# Patient Record
Sex: MALE | Race: WHITE | NOT HISPANIC OR LATINO | Employment: OTHER | URBAN - METROPOLITAN AREA
[De-identification: names, ages, dates, MRNs, and addresses within clinical notes are randomized per-mention and may not be internally consistent; named-entity substitution may affect disease eponyms.]

---

## 2024-09-18 ENCOUNTER — APPOINTMENT (OUTPATIENT)
Dept: RADIOLOGY | Facility: MEDICAL CENTER | Age: 74
DRG: 607 | End: 2024-09-18
Attending: EMERGENCY MEDICINE
Payer: MEDICARE

## 2024-09-18 ENCOUNTER — HOSPITAL ENCOUNTER (INPATIENT)
Facility: MEDICAL CENTER | Age: 74
LOS: 4 days | DRG: 607 | End: 2024-09-22
Attending: EMERGENCY MEDICINE
Payer: MEDICARE

## 2024-09-18 ENCOUNTER — APPOINTMENT (RX ONLY)
Dept: URBAN - METROPOLITAN AREA CLINIC 4 | Facility: CLINIC | Age: 74
Setting detail: DERMATOLOGY
End: 2024-09-18

## 2024-09-18 DIAGNOSIS — D485 NEOPLASM OF UNCERTAIN BEHAVIOR OF SKIN: ICD-10-CM

## 2024-09-18 DIAGNOSIS — R22.0 FACIAL MASS: ICD-10-CM

## 2024-09-18 DIAGNOSIS — Z91.89 AT RISK FOR CONSTIPATION: ICD-10-CM

## 2024-09-18 DIAGNOSIS — D64.9 NORMOCYTIC ANEMIA: ICD-10-CM

## 2024-09-18 DIAGNOSIS — E83.52 HYPERCALCEMIA: ICD-10-CM

## 2024-09-18 DIAGNOSIS — L03.211 FACIAL CELLULITIS: ICD-10-CM

## 2024-09-18 PROBLEM — D48.5 NEOPLASM OF UNCERTAIN BEHAVIOR OF SKIN: Status: ACTIVE | Noted: 2024-09-18

## 2024-09-18 LAB
ALBUMIN SERPL BCP-MCNC: 3.5 G/DL (ref 3.2–4.9)
ALBUMIN/GLOB SERPL: 1.1 G/DL
ALP SERPL-CCNC: 110 U/L (ref 30–99)
ALT SERPL-CCNC: 13 U/L (ref 2–50)
ANION GAP SERPL CALC-SCNC: 10 MMOL/L (ref 7–16)
APTT PPP: 26.5 SEC (ref 24.7–36)
AST SERPL-CCNC: 17 U/L (ref 12–45)
BASOPHILS # BLD AUTO: 0.3 % (ref 0–1.8)
BASOPHILS # BLD: 0.1 K/UL (ref 0–0.12)
BILIRUB SERPL-MCNC: 0.3 MG/DL (ref 0.1–1.5)
BUN SERPL-MCNC: 14 MG/DL (ref 8–22)
CALCIUM ALBUM COR SERPL-MCNC: 11.6 MG/DL (ref 8.5–10.5)
CALCIUM SERPL-MCNC: 11.2 MG/DL (ref 8.5–10.5)
CHLORIDE SERPL-SCNC: 103 MMOL/L (ref 96–112)
CO2 SERPL-SCNC: 24 MMOL/L (ref 20–33)
CREAT SERPL-MCNC: 0.81 MG/DL (ref 0.5–1.4)
EOSINOPHIL # BLD AUTO: 0.35 K/UL (ref 0–0.51)
EOSINOPHIL NFR BLD: 1.1 % (ref 0–6.9)
ERYTHROCYTE [DISTWIDTH] IN BLOOD BY AUTOMATED COUNT: 45.7 FL (ref 35.9–50)
GFR SERPLBLD CREATININE-BSD FMLA CKD-EPI: 91 ML/MIN/1.73 M 2
GLOBULIN SER CALC-MCNC: 3.2 G/DL (ref 1.9–3.5)
GLUCOSE SERPL-MCNC: 94 MG/DL (ref 65–99)
HCT VFR BLD AUTO: 27.4 % (ref 42–52)
HGB BLD-MCNC: 8.5 G/DL (ref 14–18)
IMM GRANULOCYTES # BLD AUTO: 0.21 K/UL (ref 0–0.11)
IMM GRANULOCYTES NFR BLD AUTO: 0.7 % (ref 0–0.9)
INR PPP: 1.08 (ref 0.87–1.13)
LYMPHOCYTES # BLD AUTO: 3.38 K/UL (ref 1–4.8)
LYMPHOCYTES NFR BLD: 10.9 % (ref 22–41)
MCH RBC QN AUTO: 28.2 PG (ref 27–33)
MCHC RBC AUTO-ENTMCNC: 31 G/DL (ref 32.3–36.5)
MCV RBC AUTO: 91 FL (ref 81.4–97.8)
MONOCYTES # BLD AUTO: 1.38 K/UL (ref 0–0.85)
MONOCYTES NFR BLD AUTO: 4.5 % (ref 0–13.4)
NEUTROPHILS # BLD AUTO: 25.53 K/UL (ref 1.82–7.42)
NEUTROPHILS NFR BLD: 82.5 % (ref 44–72)
NRBC # BLD AUTO: 0 K/UL
NRBC BLD-RTO: 0 /100 WBC (ref 0–0.2)
PLATELET # BLD AUTO: 561 K/UL (ref 164–446)
PMV BLD AUTO: 8.3 FL (ref 9–12.9)
POTASSIUM SERPL-SCNC: 4.1 MMOL/L (ref 3.6–5.5)
PROT SERPL-MCNC: 6.7 G/DL (ref 6–8.2)
PROTHROMBIN TIME: 14.1 SEC (ref 12–14.6)
RBC # BLD AUTO: 3.01 M/UL (ref 4.7–6.1)
SODIUM SERPL-SCNC: 137 MMOL/L (ref 135–145)
WBC # BLD AUTO: 31 K/UL (ref 4.8–10.8)

## 2024-09-18 PROCEDURE — A9270 NON-COVERED ITEM OR SERVICE: HCPCS

## 2024-09-18 PROCEDURE — 99223 1ST HOSP IP/OBS HIGH 75: CPT | Mod: GC

## 2024-09-18 PROCEDURE — 70450 CT HEAD/BRAIN W/O DYE: CPT

## 2024-09-18 PROCEDURE — 85025 COMPLETE CBC W/AUTO DIFF WBC: CPT

## 2024-09-18 PROCEDURE — ? ADDITIONAL NOTES

## 2024-09-18 PROCEDURE — 80053 COMPREHEN METABOLIC PANEL: CPT

## 2024-09-18 PROCEDURE — 36415 COLL VENOUS BLD VENIPUNCTURE: CPT

## 2024-09-18 PROCEDURE — 99024 POSTOP FOLLOW-UP VISIT: CPT

## 2024-09-18 PROCEDURE — 700102 HCHG RX REV CODE 250 W/ 637 OVERRIDE(OP)

## 2024-09-18 PROCEDURE — 70491 CT SOFT TISSUE NECK W/DYE: CPT

## 2024-09-18 PROCEDURE — 85610 PROTHROMBIN TIME: CPT

## 2024-09-18 PROCEDURE — 700117 HCHG RX CONTRAST REV CODE 255: Performed by: EMERGENCY MEDICINE

## 2024-09-18 PROCEDURE — 85730 THROMBOPLASTIN TIME PARTIAL: CPT

## 2024-09-18 PROCEDURE — 770006 HCHG ROOM/CARE - MED/SURG/GYN SEMI*

## 2024-09-18 PROCEDURE — ? PHOTO-DOCUMENTATION

## 2024-09-18 PROCEDURE — 99285 EMERGENCY DEPT VISIT HI MDM: CPT

## 2024-09-18 RX ORDER — AMOXICILLIN 250 MG
2 CAPSULE ORAL EVERY EVENING
Status: DISCONTINUED | OUTPATIENT
Start: 2024-09-18 | End: 2024-09-22 | Stop reason: HOSPADM

## 2024-09-18 RX ORDER — IBUPROFEN 200 MG
200 TABLET ORAL EVERY 6 HOURS PRN
COMMUNITY

## 2024-09-18 RX ORDER — POLYETHYLENE GLYCOL 3350 17 G/17G
1 POWDER, FOR SOLUTION ORAL
Status: DISCONTINUED | OUTPATIENT
Start: 2024-09-18 | End: 2024-09-22 | Stop reason: HOSPADM

## 2024-09-18 RX ORDER — ENOXAPARIN SODIUM 100 MG/ML
40 INJECTION SUBCUTANEOUS DAILY
Status: DISCONTINUED | OUTPATIENT
Start: 2024-09-19 | End: 2024-09-22 | Stop reason: HOSPADM

## 2024-09-18 RX ORDER — CLINDAMYCIN PHOSPHATE 600 MG/50ML
600 INJECTION, SOLUTION INTRAVENOUS ONCE
Status: DISCONTINUED | OUTPATIENT
Start: 2024-09-18 | End: 2024-09-18

## 2024-09-18 RX ORDER — SODIUM CHLORIDE 9 MG/ML
INJECTION, SOLUTION INTRAVENOUS CONTINUOUS
Status: ACTIVE | OUTPATIENT
Start: 2024-09-18 | End: 2024-09-20

## 2024-09-18 RX ORDER — LABETALOL HYDROCHLORIDE 5 MG/ML
10 INJECTION, SOLUTION INTRAVENOUS EVERY 4 HOURS PRN
Status: DISCONTINUED | OUTPATIENT
Start: 2024-09-18 | End: 2024-09-22 | Stop reason: HOSPADM

## 2024-09-18 RX ORDER — ACETAMINOPHEN 325 MG/1
650 TABLET ORAL EVERY 6 HOURS PRN
Status: DISCONTINUED | OUTPATIENT
Start: 2024-09-18 | End: 2024-09-19

## 2024-09-18 RX ADMIN — IOHEXOL 90 ML: 350 INJECTION, SOLUTION INTRAVENOUS at 19:30

## 2024-09-18 RX ADMIN — SENNOSIDES AND DOCUSATE SODIUM 2 TABLET: 50; 8.6 TABLET ORAL at 21:24

## 2024-09-18 RX ADMIN — ACETAMINOPHEN 650 MG: 325 TABLET ORAL at 21:24

## 2024-09-18 SDOH — ECONOMIC STABILITY: TRANSPORTATION INSECURITY
IN THE PAST 12 MONTHS, HAS THE LACK OF TRANSPORTATION KEPT YOU FROM MEDICAL APPOINTMENTS OR FROM GETTING MEDICATIONS?: NO

## 2024-09-18 SDOH — ECONOMIC STABILITY: TRANSPORTATION INSECURITY
IN THE PAST 12 MONTHS, HAS LACK OF RELIABLE TRANSPORTATION KEPT YOU FROM MEDICAL APPOINTMENTS, MEETINGS, WORK OR FROM GETTING THINGS NEEDED FOR DAILY LIVING?: NO

## 2024-09-18 ASSESSMENT — COGNITIVE AND FUNCTIONAL STATUS - GENERAL
SUGGESTED CMS G CODE MODIFIER DAILY ACTIVITY: CH
DAILY ACTIVITIY SCORE: 24
MOBILITY SCORE: 24
SUGGESTED CMS G CODE MODIFIER MOBILITY: CH

## 2024-09-18 ASSESSMENT — ENCOUNTER SYMPTOMS
ABDOMINAL PAIN: 0
MYALGIAS: 0
CONSTIPATION: 0
DIARRHEA: 0
VOMITING: 0
WEIGHT LOSS: 0
BACK PAIN: 0
HEARTBURN: 0
CHILLS: 0
FEVER: 0
HEADACHES: 0
DIZZINESS: 0
PALPITATIONS: 0
NAUSEA: 0
WEAKNESS: 0

## 2024-09-18 ASSESSMENT — LIFESTYLE VARIABLES
TOTAL SCORE: 0
AVERAGE NUMBER OF DAYS PER WEEK YOU HAVE A DRINK CONTAINING ALCOHOL: 0
HAVE YOU EVER FELT YOU SHOULD CUT DOWN ON YOUR DRINKING: NO
TOTAL SCORE: 0
ALCOHOL_USE: NO
EVER HAD A DRINK FIRST THING IN THE MORNING TO STEADY YOUR NERVES TO GET RID OF A HANGOVER: NO
EVER FELT BAD OR GUILTY ABOUT YOUR DRINKING: NO
HOW MANY TIMES IN THE PAST YEAR HAVE YOU HAD 5 OR MORE DRINKS IN A DAY: 0
HAVE PEOPLE ANNOYED YOU BY CRITICIZING YOUR DRINKING: NO
CONSUMPTION TOTAL: NEGATIVE
DOES PATIENT WANT TO STOP DRINKING: NO
ON A TYPICAL DAY WHEN YOU DRINK ALCOHOL HOW MANY DRINKS DO YOU HAVE: 0
TOTAL SCORE: 0

## 2024-09-18 ASSESSMENT — SOCIAL DETERMINANTS OF HEALTH (SDOH)
WITHIN THE PAST 12 MONTHS, THE FOOD YOU BOUGHT JUST DIDN'T LAST AND YOU DIDN'T HAVE MONEY TO GET MORE: NEVER TRUE
IN THE PAST 12 MONTHS, HAS THE ELECTRIC, GAS, OIL, OR WATER COMPANY THREATENED TO SHUT OFF SERVICE IN YOUR HOME?: NO
WITHIN THE LAST YEAR, HAVE TO BEEN RAPED OR FORCED TO HAVE ANY KIND OF SEXUAL ACTIVITY BY YOUR PARTNER OR EX-PARTNER?: NO
WITHIN THE LAST YEAR, HAVE YOU BEEN AFRAID OF YOUR PARTNER OR EX-PARTNER?: NO
WITHIN THE LAST YEAR, HAVE YOU BEEN KICKED, HIT, SLAPPED, OR OTHERWISE PHYSICALLY HURT BY YOUR PARTNER OR EX-PARTNER?: NO
WITHIN THE LAST YEAR, HAVE YOU BEEN HUMILIATED OR EMOTIONALLY ABUSED IN OTHER WAYS BY YOUR PARTNER OR EX-PARTNER?: NO
WITHIN THE PAST 12 MONTHS, YOU WORRIED THAT YOUR FOOD WOULD RUN OUT BEFORE YOU GOT THE MONEY TO BUY MORE: NEVER TRUE

## 2024-09-18 ASSESSMENT — PAIN DESCRIPTION - PAIN TYPE
TYPE: ACUTE PAIN
TYPE: ACUTE PAIN

## 2024-09-18 ASSESSMENT — PATIENT HEALTH QUESTIONNAIRE - PHQ9
1. LITTLE INTEREST OR PLEASURE IN DOING THINGS: NOT AT ALL
SUM OF ALL RESPONSES TO PHQ9 QUESTIONS 1 AND 2: 0
2. FEELING DOWN, DEPRESSED, IRRITABLE, OR HOPELESS: NOT AT ALL

## 2024-09-18 ASSESSMENT — FIBROSIS 4 INDEX: FIB4 SCORE: 0.64

## 2024-09-18 NOTE — ED TRIAGE NOTES
Chief Complaint   Patient presents with    Sent by MD     Pt ambulated to triage sent by Skin Specialist to get evaluation for skin lesion in right side of face. Pt states that he had it for a year.      Also reports pain in right side of face rates as 8/10. Afebrile. Aaox4. Images taken and uploaded in pt's chart.  Educated on triage process. Instructed to notify staff for any worsening symptoms. Denies any recent travel. Denies exposure to known covid positive patients. Denies any respiratory symptoms.    Vitals:    09/18/24 1424   BP: 139/64   Pulse: 96   Resp: 18   Temp: 36.5 °C (97.7 °F)   SpO2: 98%

## 2024-09-18 NOTE — ED NOTES
Patient to green 38 from triage. Agree with triage note. Patient reports the wound has been unchanged for a year.

## 2024-09-18 NOTE — PROCEDURE: ADDITIONAL NOTES
Additional Notes: Patient presents today with clear diagnosis of cancer, although probable SCC, we are unable to determine without biopsy. We discussed options of doing multiple biopsies today of suspicious lesions on patients face and head, however, patient only has Medicare Part A insurance. Given this is not a tumor than can be managed outpatient, Dr. Hernandez and Dr. Read were also consulted and evaluated the patient, mutually agreeing that he would be best treated at [facility] for his condition. We discussed that there is a secondary infection as well, that is leading to the drainage and malodor, which must be treated with antibiotics to prevent sepsis. After discussing in great detail and explaining the severity of the situation to the patient, he agreed to go to [facility] to be admitted. Dr. Hernandez contacted [facility] prior to patient’s arrival.
Detail Level: Simple
Render Risk Assessment In Note?: no

## 2024-09-18 NOTE — PROCEDURE: PHOTO-DOCUMENTATION
Detail Level: Zone
Photo Preface (Leave Blank If You Do Not Want): Photographs were obtained today on face

## 2024-09-19 LAB
ALBUMIN SERPL BCP-MCNC: 2.9 G/DL (ref 3.2–4.9)
ALBUMIN SERPL BCP-MCNC: 3.2 G/DL (ref 3.2–4.9)
ALBUMIN/GLOB SERPL: 1.3 G/DL
ALP SERPL-CCNC: 96 U/L (ref 30–99)
ALT SERPL-CCNC: 12 U/L (ref 2–50)
ANION GAP SERPL CALC-SCNC: 13 MMOL/L (ref 7–16)
AST SERPL-CCNC: 15 U/L (ref 12–45)
BILIRUB SERPL-MCNC: 0.2 MG/DL (ref 0.1–1.5)
BUN SERPL-MCNC: 12 MG/DL (ref 8–22)
BUN SERPL-MCNC: 12 MG/DL (ref 8–22)
CALCIUM ALBUM COR SERPL-MCNC: 11.5 MG/DL (ref 8.5–10.5)
CALCIUM ALBUM COR SERPL-MCNC: 11.7 MG/DL (ref 8.5–10.5)
CALCIUM SERPL-MCNC: 10.8 MG/DL (ref 8.5–10.5)
CALCIUM SERPL-MCNC: 10.9 MG/DL (ref 8.5–10.5)
CHLORIDE SERPL-SCNC: 104 MMOL/L (ref 96–112)
CHLORIDE SERPL-SCNC: 108 MMOL/L (ref 96–112)
CO2 SERPL-SCNC: 22 MMOL/L (ref 20–33)
CO2 SERPL-SCNC: 23 MMOL/L (ref 20–33)
CREAT SERPL-MCNC: 0.72 MG/DL (ref 0.5–1.4)
CREAT SERPL-MCNC: 1.01 MG/DL (ref 0.5–1.4)
ERYTHROCYTE [DISTWIDTH] IN BLOOD BY AUTOMATED COUNT: 45.1 FL (ref 35.9–50)
FERRITIN SERPL-MCNC: 27.7 NG/ML (ref 22–322)
GFR SERPLBLD CREATININE-BSD FMLA CKD-EPI: 77 ML/MIN/1.73 M 2
GFR SERPLBLD CREATININE-BSD FMLA CKD-EPI: 94 ML/MIN/1.73 M 2
GLOBULIN SER CALC-MCNC: 2.5 G/DL (ref 1.9–3.5)
GLUCOSE SERPL-MCNC: 135 MG/DL (ref 65–99)
GLUCOSE SERPL-MCNC: 79 MG/DL (ref 65–99)
GRAM STN SPEC: NORMAL
HCT VFR BLD AUTO: 27.4 % (ref 42–52)
HGB BLD-MCNC: 8.5 G/DL (ref 14–18)
IRON SATN MFR SERPL: 7 % (ref 15–55)
IRON SERPL-MCNC: 17 UG/DL (ref 50–180)
MCH RBC QN AUTO: 28 PG (ref 27–33)
MCHC RBC AUTO-ENTMCNC: 31 G/DL (ref 32.3–36.5)
MCV RBC AUTO: 90.1 FL (ref 81.4–97.8)
PHOSPHATE SERPL-MCNC: 2.1 MG/DL (ref 2.5–4.5)
PLATELET # BLD AUTO: 528 K/UL (ref 164–446)
PMV BLD AUTO: 8.5 FL (ref 9–12.9)
POTASSIUM SERPL-SCNC: 3.9 MMOL/L (ref 3.6–5.5)
POTASSIUM SERPL-SCNC: 3.9 MMOL/L (ref 3.6–5.5)
PROT SERPL-MCNC: 5.7 G/DL (ref 6–8.2)
PTH-INTACT SERPL-MCNC: 2.7 PG/ML (ref 14–72)
RBC # BLD AUTO: 3.04 M/UL (ref 4.7–6.1)
SCCMEC + MECA PNL NOSE NAA+PROBE: NEGATIVE
SIGNIFICANT IND 70042: NORMAL
SITE SITE: NORMAL
SODIUM SERPL-SCNC: 139 MMOL/L (ref 135–145)
SODIUM SERPL-SCNC: 140 MMOL/L (ref 135–145)
SOURCE SOURCE: NORMAL
TIBC SERPL-MCNC: 261 UG/DL (ref 250–450)
UIBC SERPL-MCNC: 244 UG/DL (ref 110–370)
WBC # BLD AUTO: 28.2 K/UL (ref 4.8–10.8)

## 2024-09-19 PROCEDURE — 85027 COMPLETE CBC AUTOMATED: CPT

## 2024-09-19 PROCEDURE — 700111 HCHG RX REV CODE 636 W/ 250 OVERRIDE (IP): Mod: JZ | Performed by: INTERNAL MEDICINE

## 2024-09-19 PROCEDURE — 99418 PROLNG IP/OBS E/M EA 15 MIN: CPT | Performed by: INTERNAL MEDICINE

## 2024-09-19 PROCEDURE — 36415 COLL VENOUS BLD VENIPUNCTURE: CPT

## 2024-09-19 PROCEDURE — 87070 CULTURE OTHR SPECIMN AEROBIC: CPT

## 2024-09-19 PROCEDURE — 83970 ASSAY OF PARATHORMONE: CPT

## 2024-09-19 PROCEDURE — 87077 CULTURE AEROBIC IDENTIFY: CPT | Mod: 91

## 2024-09-19 PROCEDURE — 770006 HCHG ROOM/CARE - MED/SURG/GYN SEMI*

## 2024-09-19 PROCEDURE — 87186 SC STD MICRODIL/AGAR DIL: CPT

## 2024-09-19 PROCEDURE — 80053 COMPREHEN METABOLIC PANEL: CPT

## 2024-09-19 PROCEDURE — 700111 HCHG RX REV CODE 636 W/ 250 OVERRIDE (IP): Mod: JZ

## 2024-09-19 PROCEDURE — 99233 SBSQ HOSP IP/OBS HIGH 50: CPT | Performed by: INTERNAL MEDICINE

## 2024-09-19 PROCEDURE — A9270 NON-COVERED ITEM OR SERVICE: HCPCS | Performed by: INTERNAL MEDICINE

## 2024-09-19 PROCEDURE — 82728 ASSAY OF FERRITIN: CPT

## 2024-09-19 PROCEDURE — 700105 HCHG RX REV CODE 258

## 2024-09-19 PROCEDURE — A9270 NON-COVERED ITEM OR SERVICE: HCPCS

## 2024-09-19 PROCEDURE — 87040 BLOOD CULTURE FOR BACTERIA: CPT | Mod: 91

## 2024-09-19 PROCEDURE — 87205 SMEAR GRAM STAIN: CPT

## 2024-09-19 PROCEDURE — 83550 IRON BINDING TEST: CPT

## 2024-09-19 PROCEDURE — 700101 HCHG RX REV CODE 250: Performed by: INTERNAL MEDICINE

## 2024-09-19 PROCEDURE — 87076 CULTURE ANAEROBE IDENT EACH: CPT

## 2024-09-19 PROCEDURE — 80069 RENAL FUNCTION PANEL: CPT

## 2024-09-19 PROCEDURE — 87641 MR-STAPH DNA AMP PROBE: CPT

## 2024-09-19 PROCEDURE — 700102 HCHG RX REV CODE 250 W/ 637 OVERRIDE(OP): Performed by: INTERNAL MEDICINE

## 2024-09-19 PROCEDURE — 97602 WOUND(S) CARE NON-SELECTIVE: CPT

## 2024-09-19 PROCEDURE — 83540 ASSAY OF IRON: CPT

## 2024-09-19 PROCEDURE — 700102 HCHG RX REV CODE 250 W/ 637 OVERRIDE(OP)

## 2024-09-19 RX ORDER — BACITRACIN ZINC AND POLYMYXIN B SULFATE 500; 1000 [USP'U]/G; [USP'U]/G
OINTMENT TOPICAL 2 TIMES DAILY
Status: DISCONTINUED | OUTPATIENT
Start: 2024-09-19 | End: 2024-09-22 | Stop reason: HOSPADM

## 2024-09-19 RX ORDER — ACETAMINOPHEN 325 MG/1
650 TABLET ORAL EVERY 6 HOURS PRN
Status: DISCONTINUED | OUTPATIENT
Start: 2024-09-24 | End: 2024-09-22 | Stop reason: HOSPADM

## 2024-09-19 RX ORDER — OXYCODONE HYDROCHLORIDE 5 MG/1
5 TABLET ORAL
Status: DISCONTINUED | OUTPATIENT
Start: 2024-09-19 | End: 2024-09-22 | Stop reason: HOSPADM

## 2024-09-19 RX ORDER — FERROUS SULFATE 325(65) MG
325 TABLET ORAL 2 TIMES DAILY WITH MEALS
Status: DISCONTINUED | OUTPATIENT
Start: 2024-09-19 | End: 2024-09-22 | Stop reason: HOSPADM

## 2024-09-19 RX ORDER — ACETAMINOPHEN 325 MG/1
650 TABLET ORAL EVERY 6 HOURS
Status: DISCONTINUED | OUTPATIENT
Start: 2024-09-19 | End: 2024-09-22 | Stop reason: HOSPADM

## 2024-09-19 RX ORDER — HYDROMORPHONE HYDROCHLORIDE 1 MG/ML
0.25 INJECTION, SOLUTION INTRAMUSCULAR; INTRAVENOUS; SUBCUTANEOUS
Status: DISCONTINUED | OUTPATIENT
Start: 2024-09-19 | End: 2024-09-22 | Stop reason: HOSPADM

## 2024-09-19 RX ORDER — LINEZOLID 600 MG/1
600 TABLET, FILM COATED ORAL EVERY 12 HOURS
Status: DISCONTINUED | OUTPATIENT
Start: 2024-09-19 | End: 2024-09-19

## 2024-09-19 RX ORDER — OXYCODONE HYDROCHLORIDE 5 MG/1
2.5 TABLET ORAL
Status: DISCONTINUED | OUTPATIENT
Start: 2024-09-19 | End: 2024-09-22 | Stop reason: HOSPADM

## 2024-09-19 RX ADMIN — Medication 1 EACH: at 17:07

## 2024-09-19 RX ADMIN — ACETAMINOPHEN 650 MG: 325 TABLET ORAL at 22:20

## 2024-09-19 RX ADMIN — OXYCODONE HYDROCHLORIDE 5 MG: 5 TABLET ORAL at 17:39

## 2024-09-19 RX ADMIN — OXYCODONE HYDROCHLORIDE 5 MG: 5 TABLET ORAL at 22:21

## 2024-09-19 RX ADMIN — SODIUM CHLORIDE: 9 INJECTION, SOLUTION INTRAVENOUS at 22:22

## 2024-09-19 RX ADMIN — ACETAMINOPHEN 650 MG: 325 TABLET ORAL at 05:02

## 2024-09-19 RX ADMIN — AMPICILLIN AND SULBACTAM 3 G: 1; 2 INJECTION, POWDER, FOR SOLUTION INTRAMUSCULAR; INTRAVENOUS at 12:18

## 2024-09-19 RX ADMIN — HYDROMORPHONE HYDROCHLORIDE 0.25 MG: 1 INJECTION, SOLUTION INTRAMUSCULAR; INTRAVENOUS; SUBCUTANEOUS at 19:41

## 2024-09-19 RX ADMIN — AMPICILLIN AND SULBACTAM 3 G: 1; 2 INJECTION, POWDER, FOR SOLUTION INTRAMUSCULAR; INTRAVENOUS at 00:56

## 2024-09-19 RX ADMIN — SODIUM CHLORIDE: 9 INJECTION, SOLUTION INTRAVENOUS at 00:54

## 2024-09-19 RX ADMIN — FERROUS SULFATE TAB 325 MG (65 MG ELEMENTAL FE) 325 MG: 325 (65 FE) TAB at 17:07

## 2024-09-19 RX ADMIN — ACETAMINOPHEN 650 MG: 325 TABLET ORAL at 12:23

## 2024-09-19 RX ADMIN — AMOXICILLIN AND CLAVULANATE POTASSIUM 1 TABLET: 875; 125 TABLET, FILM COATED ORAL at 17:07

## 2024-09-19 RX ADMIN — Medication 1 EACH: at 13:04

## 2024-09-19 RX ADMIN — AMPICILLIN AND SULBACTAM 3 G: 1; 2 INJECTION, POWDER, FOR SOLUTION INTRAMUSCULAR; INTRAVENOUS at 05:05

## 2024-09-19 RX ADMIN — ACETAMINOPHEN 650 MG: 325 TABLET ORAL at 17:07

## 2024-09-19 RX ADMIN — LINEZOLID 600 MG: 600 TABLET, FILM COATED ORAL at 05:02

## 2024-09-19 RX ADMIN — SENNOSIDES AND DOCUSATE SODIUM 2 TABLET: 50; 8.6 TABLET ORAL at 17:07

## 2024-09-19 ASSESSMENT — ENCOUNTER SYMPTOMS
CHILLS: 0
EYE REDNESS: 0
PALPITATIONS: 0
NERVOUS/ANXIOUS: 0
INSOMNIA: 0
DIZZINESS: 0
SHORTNESS OF BREATH: 0
COUGH: 0
WHEEZING: 0
DIARRHEA: 0
FEVER: 0
VOMITING: 0
FALLS: 0
WEAKNESS: 0
NAUSEA: 0
FOCAL WEAKNESS: 0
HEADACHES: 0
SEIZURES: 0
TREMORS: 0
CONSTIPATION: 0
LOSS OF CONSCIOUSNESS: 0
ABDOMINAL PAIN: 0
MYALGIAS: 0
HEMOPTYSIS: 0
EYE PAIN: 0
BLOOD IN STOOL: 0

## 2024-09-19 ASSESSMENT — PAIN DESCRIPTION - PAIN TYPE
TYPE: ACUTE PAIN

## 2024-09-19 NOTE — ASSESSMENT & PLAN NOTE
Suspect hypercalcemia of malignancy   -PTH with morning labs to further eval for other causes   -IVF        Mild  IV fluids  Trend

## 2024-09-19 NOTE — CARE PLAN
The patient is Stable - Low risk of patient condition declining or worsening    Shift Goals  Clinical Goals: Pt will report a pain level of 4 or less within 12 hour shift  Patient Goals: pain control  Family Goals: gaetano    Progress made toward(s) clinical / shift goals:  Pt alert and able to make needs known. Call light and personal belon    Problem: Pain - Standard  Goal: Alleviation of pain or a reduction in pain to the patient’s comfort goal  Outcome: Progressing     Problem: Knowledge Deficit - Standard  Goal: Patient and family/care givers will demonstrate understanding of plan of care, disease process/condition, diagnostic tests and medications  Outcome: Progressing       Patient is not progressing towards the following goals:

## 2024-09-19 NOTE — ANTIMICROBIAL STEWARDSHIP
Antimicrobial Stewardship Rounds Note    Date  9/19/2024    Assessment  Patient chart reviewed during antimicrobial stewardship rounds with antimicrobial stewardship medical director Dr. Jericho Alatorre. Patient admit for evaluation of fungating facial mass on right side of face including ear. Patient reports mass for last 7 years that rapidly proliferate in the last year. Patient has not had this evaluated before and reports not seeing a physician before but he sees a homeopath. Patient describes the mass as a shocking or burning pain that varies in intensity and has interfered with sleep. The head CT without contrast showed the mass invading the right external auditory canal and subcutaneous tissues that is consistent with squamous cell carcinoma. There is another mass right anterior superior frontal subcutaneous tissue also consistent with squamous cell carcinoma. Patient does have high leukocytosis with WBC 31 down to 28.2 but has been afebrile. Patient's MRSA nares is negative and blood cultures are no growth to date.    Recommendation  Based on the assessment above, the antimicrobial stewardship team recommends stopping linezolid and Unasyn and completing 10 days of treatment with Augmentin 875-125 mg by mouth twice daily. Discussed with Dr. Grossman, who agreed. Orders were updated in the chart by this pharmacist.     Luis Angel Lozano, PharmD  [facility] Clinical Specialist

## 2024-09-19 NOTE — WOUND TEAM
[facility]  Inpatient Services  Initial Wound and Skin Care Evaluation    Admission Date: 2024     Last order of IP CONSULT TO WOUND CARE was found on 2024 from Hospital Encounter on 2024     HPI, PMH, SH: Reviewed    No past surgical history on file.  Social History     Tobacco Use    Smoking status: Former     Current packs/day: 0.00     Average packs/day: 0.5 packs/day for 6.0 years (3.0 ttl pk-yrs)     Types: Cigarettes     Start date: 1968     Quit date: 1974     Years since quittin.7    Smokeless tobacco: Never   Substance Use Topics    Alcohol use: Not on file     Chief Complaint   Patient presents with    Sent by MD     Pt ambulated to triage sent by Skin Specialist to get evaluation for skin lesion in right side of face. Pt states that he had it for a year.      Diagnosis: Facial mass [R22.0]    Unit where seen by Wound Team: S609/01     WOUND CONSULT RELATED TO:  Right Face    WOUND TEAM PLAN OF CARE - Frequency of Follow-up:   Nursing to follow dressing orders written for wound care. Contact wound team if area fails to progress, deteriorates or with any questions/concerns if something comes up before next scheduled follow up (See below as to whether wound is following and frequency of wound follow up)   Weekly - Right face    WOUND HISTORY:   Patient is a 74-year-old male with unknown past medical history presents due to right-sided facial mass after being sent over by skin specialist. Patient states the mass has been growing for the past 7 years, drainage and sloughing of skin, with CT scan demonstrating extension to the external auditory canal, slight erythema detailing the margins with leukocytosis likely suggesting acute infection secondary to necrotic mass possibly resembling squamous cell carcinoma. Started patient on empiric linezolid and Unasyn, will likely need skin biopsy as well as plastic surgery for resection, see media for pictures. Also has  hypercalcemia, will give NS and trend, possibly secondary to malignancy.        WOUND ASSESSMENT/LDA  Wound 09/18/24 Neoplasm Ear;Face Lateral Right (Active)   Date First Assessed/Time First Assessed: 09/18/24 2124   Present on Original Admission: Yes  Hand Hygiene Completed: Yes  Primary Wound Type: Neoplasm  Location: Ear;Face  Wound Orientation: Lateral  Laterality: Right      Assessments 9/19/2024 11:30 AM   Wound Image      Site Assessment Yellow;Brown;Crusted;Slough;Drainage;Bleeding;Painful   Periwound Assessment Intact;Pink;Fragile   Margins Attached edges;Defined edges   Closure Secondary intention   Drainage Amount Moderate   Drainage Description Tan;Serosanguineous;Purulent   Treatments Cleansed;Nonselective debridement;Wound culture   Wound Cleansing Foam Cleanser/Washcloth   Periwound Protectant Not Applicable   Dressing Status Clean;Dry;Intact   Dressing Changed New   Dressing Cleansing/Solutions Not Applicable   Dressing Options Absorbent Abdominal Pad;Spandage   Dressing Change/Treatment Frequency Daily, and As Needed   NEXT Dressing Change/Treatment Date 09/20/24   Wound Team Following Weekly   Wound Length (cm) 12.5 cm   Wound Width (cm) 12 cm   Wound Surface Area (cm^2) 150 cm^2   Shape irregular   Wound Odor Strong   WOUND NURSE ONLY - Time Spent with Patient (mins) 45       Wound 09/18/24 Neoplasm Forehead Left (Active)   Date First Assessed/Time First Assessed: 09/18/24 2124   Present on Original Admission: Yes  Hand Hygiene Completed: Yes  Primary Wound Type: Neoplasm  Location: Forehead  Laterality: Left      Assessments 9/19/2024 11:30 AM   Wound Image     Site Assessment Brown;Yellow;Crusted   Periwound Assessment Clean;Dry;Intact   Margins Defined edges   Closure Open to air   Drainage Amount None   Wound Cleansing Not Applicable   Periwound Protectant Not Applicable   Dressing Status Open to Air   Wound Team Following Not following       L heel  R heel  Sacrum    Vascular:    SWEETIE:   No  results found.    Lab Values:    Lab Results   Component Value Date/Time    WBC 28.2 (H) 09/19/2024 12:12 AM    RBC 3.04 (L) 09/19/2024 12:12 AM    HEMOGLOBIN 8.5 (L) 09/19/2024 12:12 AM    HEMATOCRIT 27.4 (L) 09/19/2024 12:12 AM         Culture Results show:  No results found for this or any previous visit (from the past 720 hour(s)).    Pain Level/Medicated:  None, Tolerated without pain medication       INTERVENTIONS BY WOUND TEAM:  Chart and images reviewed. Discussed with bedside RN. All areas of concern (based on picture review, LDA review and discussion with bedside RN) have been thoroughly assessed. Documentation of areas based on significant findings. This RN in to assess patient. Performed standard wound care which includes appropriate positioning, dressing removal and non-selective debridement. Pictures and measurements obtained weekly if/when required.    Wound:  Right face  Cleansed/Non-selectively Debrided with:  Normal Saline, No rinse foam soap, and Gauze  Yenny wound: Cleansed with Normal Saline, No rinse foam soap, and Gauze, Prepped with N/A  Primary Dressing:  ABD pad (topical antibiotic ointment ordered)  Secondary (Outer) Dressing: Spandage    Advanced Wound Care Discharge Planning  Number of Clinicians necessary to complete wound care: 1  Is patient requiring IV pain medications for dressing changes:  No   Length of time for dressing change 30 min. (This does not include chart review, pre-medication time, set up, clean up or time spent charting.)    Interdisciplinary consultation: Patient, Bedside RN (Usha), N/A.  Pressure injury and staging reviewed with N/A.    EVALUATION / RATIONALE FOR TREATMENT:     Date:  09/19/24  Wound Status:  Initial evaluation    Bilateral heels and sacrum are intact and blanching.     The patient with right facial mass that begins mid-cheek, extends to the inferior portion of the ear, and ends at the lateral portion of the neck. The wound has a strong foul scent,  with irregular growths that are brown/red/yellow in color. The area is fragile, and painful. Areas cleansed. Antibiotic ointment ordered, unable to apply as medication was not available on unit. Pharmacy will send for bedside application. Spandage used to hold ABD pad in place used to manage drainage and exudate.          Goals: Steady decrease in wound area and depth weekly.    NURSING PLAN OF CARE ORDERS:  Dressing changes: See Dressing Care orders    NUTRITION RECOMMENDATIONS   Wound Team Recommendations:  N/A    DIET ORDERS (From admission to next 24h)       Start     Ordered    09/19/24 6233  Diet Order Diet: Regular  ALL MEALS        Question:  Diet:  Answer:  Regular    09/19/24 9597                    PREVENTATIVE INTERVENTIONS:    Q shift Adithya - performed per nursing policy  Q shift pressure point assessments - performed per nursing policy    Surface/Positioning  Standard/trauma mattress - Currently in Place    Offloading/Redistribution  N/A      Containment/Moisture Prevention    Dri-elida pad - Currently in Place    Mobilization      Ambulate      Anticipated discharge plans:  TBD        Vac Discharge Needs:  Vac Discharge plan is purely a recommendation from wound team and not a requirement for discharge unless otherwise stated by physician.  Not Applicable Pt not on a wound vac

## 2024-09-19 NOTE — ED NOTES
Bedside report received from off going RN/tech: Maye/Patricia,, assumed care of patient.  POC discussed with patient. Call light within reach, all needs addressed at this time.       Fall risk interventions in place: Patient's personal possessions are with in their safe reach, Place socks on patient, Place fall risk sign on patient's door, Keep floor surfaces clean and dry, and Accompanied to restroom (all applicable per Wynona Fall risk assessment)   Continuous monitoring: Cardiac Leads, Pulse Ox, or Blood Pressure  IVF/IV medications: Not Applicable   Oxygen: Room Air  Bedside sitter: Not Applicable   Isolation: Not Applicable

## 2024-09-19 NOTE — PROGRESS NOTES
4 Eyes Skin Assessment Completed by JEREMÍAS Serra and JEREMÍAS Pearl.    Head Redness  Ears Redness and Discoloration  Nose WDL  Mouth WDL  Neck WDL  Breast/Chest WDL  Shoulder Blades WDL  Spine WDL  (R) Arm/Elbow/Hand WDL  (L) Arm/Elbow/Hand WDL  Abdomen WDL  Groin WDL  Scrotum/Coccyx/Buttocks WDL  (R) Leg Scab, Swelling, and Shiny  (L) Leg Swelling and Shiny  (R) Heel/Foot/Toe Redness and Blanching  (L) Heel/Foot/Toe Redness, Blanching, and Scab          Devices In Places None      Interventions In Place Pillows    Possible Skin Injury Yes    Pictures Uploaded Into Epic Yes  Wound Consult Placed Yes  RN Wound Prevention Protocol Ordered Yes

## 2024-09-19 NOTE — PROGRESS NOTES
[facility] Medicine Daily Progress Note    Date of Service  9/19/2024    Chief Complaint  Jean Holland is a 74 y.o. male admitted 9/18/2024 with Sent by MD (Pt ambulated to triage sent by Skin Specialist to get evaluation for skin lesion in right side of face. Pt states that he had it for a year. )    Hospital Course  No notes on file    Interval Problem Update  Patient seen and examine at bedside  73yo M no known medical problems not on prescription medications, no surgical history, presented 9/18/2024 to our ED from [facility] for wourk-up and management of extensive fungating necrotic facial lesion extending to the R face and ear. FIrst reported small lesion inferior to his earlobe 7 years ago bit for the last year progressed. Was seen at [facility] and advised to come to the ED. At the ED, he is afebrile hemodynamically stable. WBC 28.2, Cr- 0.72, Ca 10.2    Managing facial mass possible SCC needs resection and biopsy, hypercalcemia.  WBC 30K, bld Cx 9/19 NGTD. Antibiotics started. IV fluids for mild hypercalcemia, ordered Vit D level and PTH.  I will order wound cultures though could contain skin mario. I suspect he would get OR cultures if he gets a procedure. Hb 8.5. Reviewed itron panel Ordered PO iron.   Aside from the fungating lesion he has other lesions in the other side of the face and forehead, seems like actinic keratoses versus SCC. He denies dizziness, shortness of breath, chest pain or palpitations.     I spent time with patient, family and with consultants.   Discussed with [facility] Pharm[facility] regarding antibiotics.   Spoke with Dr. Willams will do skin biopsy TOMORROW. His opinion is might be too high risk for resection may just need palliative rads. I spoke with Dr. Contreras, Plastics. He agrees that resection may be difficult. I spoke with Dr. Robles. He likely will need Oncology eval as well as Rad Onc. He will see patient for definitive plan TOMORROW.  I saw patient twice, the  latter with family and answered their questions, informed them of the surgeons and their opinions. Since no imminent surgery I started him on a diet.   I reviewed the chart along with vitals, labs, imaging, test (both pending and resulted) and recommendations from specialists and interdisciplinary team.      I have discussed this patient's plan of care and discharge plan at IDT rounds today with Case Management, Nursing, Nursing leadership, and other members of the IDT team.    Consultants/Specialty  ENT  SUrgOnc    Code Status  DNAR/DNI    Disposition  The patient is not medically cleared for discharge to home or a post-acute facility.      I have placed the appropriate orders for post-discharge needs.    Review of Systems  Review of Systems   Constitutional:  Negative for chills and fever.   HENT:  Negative for congestion, hearing loss and nosebleeds.    Eyes:  Negative for pain and redness.   Respiratory:  Negative for cough, hemoptysis, shortness of breath and wheezing.    Cardiovascular:  Negative for chest pain and palpitations.   Gastrointestinal:  Negative for abdominal pain, blood in stool, constipation, diarrhea, nausea and vomiting.   Genitourinary:  Negative for dysuria, frequency and hematuria.   Musculoskeletal:  Negative for falls, joint pain and myalgias.   Skin:  Positive for rash.   Neurological:  Negative for dizziness, tremors, focal weakness, seizures, loss of consciousness, weakness and headaches.   Psychiatric/Behavioral:  The patient is not nervous/anxious and does not have insomnia.    All other systems reviewed and are negative.       Physical Exam  Temp:  [36.1 °C (97 °F)-36.5 °C (97.7 °F)] 36.5 °C (97.7 °F)  Pulse:  [79-94] 94  Resp:  [16-18] 16  BP: (112-154)/(61-75) 118/62  SpO2:  [95 %-99 %] 95 %    Physical Exam  Vitals and nursing note reviewed.   Constitutional:       Comments: Elderly, frail   HENT:      Head: Normocephalic and atraumatic.      Comments: Large fungating,  ulcerative/necrotic mass R face extending to ear. Eyes are spared.      Right Ear: External ear normal.      Left Ear: External ear normal.      Nose: Nose normal.      Mouth/Throat:      Mouth: Mucous membranes are moist.   Eyes:      General: No scleral icterus.     Conjunctiva/sclera: Conjunctivae normal.   Cardiovascular:      Rate and Rhythm: Normal rate and regular rhythm.      Heart sounds: No murmur heard.     No friction rub. No gallop.   Pulmonary:      Effort: Pulmonary effort is normal.      Breath sounds: Normal breath sounds.   Abdominal:      General: Abdomen is flat. Bowel sounds are normal. There is no distension.      Palpations: Abdomen is soft.      Tenderness: There is no abdominal tenderness. There is no guarding.   Musculoskeletal:         General: Normal range of motion.      Cervical back: Normal range of motion and neck supple.   Skin:     General: Skin is warm.   Neurological:      Mental Status: He is alert and oriented to person, place, and time. Mental status is at baseline.   Psychiatric:         Mood and Affect: Mood normal.         Behavior: Behavior normal.         Thought Content: Thought content normal.         Judgment: Judgment normal.         Fluids  No intake or output data in the 24 hours ending 09/19/24 1634     Laboratory  Recent Labs     09/18/24  1740 09/19/24  0012   WBC 31.0* 28.2*   RBC 3.01* 3.04*   HEMOGLOBIN 8.5* 8.5*   HEMATOCRIT 27.4* 27.4*   MCV 91.0 90.1   MCH 28.2 28.0   MCHC 31.0* 31.0*   RDW 45.7 45.1   PLATELETCT 561* 528*   MPV 8.3* 8.5*     Recent Labs     09/18/24  1740 09/19/24  0140   SODIUM 137 139   POTASSIUM 4.1 3.9   CHLORIDE 103 104   CO2 24 22   GLUCOSE 94 79   BUN 14 12   CREATININE 0.81 0.72   CALCIUM 11.2* 10.9*     Recent Labs     09/18/24  1740   APTT 26.5   INR 1.08               Imaging  CT-SOFT TISSUE NECK WITH   Final Result      1.  Large RIGHT facial and neck mass involving both the external ear and the external auditory canal, likely  neoplastic. This obscures the RIGHT parotid gland entirely.   2.  Small cutaneous or subcutaneous nodule overlying the LEFT side diaphragmatic arch   3.  No abnormally enlarged lymph nodes in the neck.      CT-HEAD W/O   Final Result         1. 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 There is much smaller 1.4 cm irregular    soft tissue density in the right anterior superior frontal subcutaneous soft tissues also consistent with malignancy.      2. Age-related cerebral atrophy.              Assessment/Plan  * Facial mass- (present on admission)  Assessment & Plan  CT with very large malignant R sided periauricular mass most likely SCC. Signs of infection with visible purulent discharge and WBCs >30.   Antibiotics  Spoke with ENT, skin biopsy planned  Spoke with Surg Onc who will see pt.    Hypercalcemia  Assessment & Plan  Suspect hypercalcemia of malignancy   -PTH with morning labs to further eval for other causes   -IVF        Mild  IV fluids  Trend    Normocytic anemia  Assessment & Plan  Suspect blood loss anemia in setting of oozing facial mass   -Iron panel and ferritin for further assessment   -Trend while inpt   Recent Labs     09/18/24  1740 09/19/24  0012   HEMOGLOBIN 8.5* 8.5*   HEMATOCRIT 27.4* 27.4*   MCV 91.0 90.1   MCH 28.2 28.0   PLATELETCT 561* 528*     No active bleed. Mixed iron deficiency; I will order iron supplementation         VTE prophylaxis:   SCDs/TEDs  Chemoppx if no further procedures    I have performed a physical exam and reviewed and updated ROS and Plan today (9/19/2024). In review of yesterday's note (9/18/2024), there are no changes except as documented above.    Patient is has a high medical complexity, complex decision making and is at high risk for complication, morbidity, and mortality, thus requiring the  highest level of my preparedness for sudden, emergent intervention. Medical decision making is therefore complex. I provided  services, which included ordering labs and/or imaging, and discussing the case with various consultants.medication orders, frequent reevaluations of the patient's condition and response to treatment. Time was also devoted to counseling and coordinating care including review of records, pertinent lab data and studies, as well as discussing diagnostic evaluation and work up, planned therapeutic interventions and future disposition of care. Where indicated, the assessment and plan reflect discussion of patient with consultants, other healthcare providers, family members, and additional research needed to obtain further information in formulating the plan of care for Jean Holland. Total time spent was 69 minutes.

## 2024-09-19 NOTE — CARE PLAN
The patient is Watcher - Medium risk of patient condition declining or worsening    Shift Goals  Clinical Goals: safety, iv abx  Patient Goals: pain control. rest    Progress made toward(s) clinical / shift goals:    Problem: Pain - Standard  Goal: Alleviation of pain or a reduction in pain to the patient’s comfort goal  Outcome: Progressing     Problem: Knowledge Deficit - Standard  Goal: Patient and family/care givers will demonstrate understanding of plan of care, disease process/condition, diagnostic tests and medications  Outcome: Progressing       Patient is not progressing towards the following goals:    Pt admitted this shift d/t chronic facial lesion. Blood cxs obtained and IV abx started. Pt NPO for possible sx intervention today. Tylenol given x2 for c/o pain.

## 2024-09-19 NOTE — CARE PLAN
The patient is Stable - Low risk of patient condition declining or worsening    Shift Goals  Clinical Goals: Pt will report a pain level of 4 or less within 12 hour shift  Patient Goals: pain control  Family Goals: gaetano    Progress made toward(s) clinical / shift goals:  Pt alert and able to make needs known. Call light and personal belongings in reach. Bed locked in lowest position. Wound care completed and abx given as ordered. Pain medications given prn. All needs met at this time.     Problem: Pain - Standard  Goal: Alleviation of pain or a reduction in pain to the patient’s comfort goal  Outcome: Progressing     Problem: Knowledge Deficit - Standard  Goal: Patient and family/care givers will demonstrate understanding of plan of care, disease process/condition, diagnostic tests and medications  Outcome: Progressing       Patient is not progressing towards the following goals:

## 2024-09-19 NOTE — ED PROVIDER NOTES
"ED Provider Note    CHIEF COMPLAINT  Chief Complaint   Patient presents with    Sent by MD Villatoro ambulated to triage sent by Skin Specialist to get evaluation for skin lesion in right side of face. Pt states that he had it for a year.        HPI/ROS    Jean Holland is a 74 y.o. male who presents with an extensive fungating lesion to the right side of his face and ear.  The patient states that started with a small lesion inferior to his right earlobe about 7 years ago.  Over the last year he has had pretty significant progression.  He was referred to the skin cancer [place] and then transferred here for higher level of care as the patient will require an extensive resection.  The patient does not have any associated fevers.  He has not had any weight loss.  He does not have a headache.    PAST MEDICAL HISTORY       SURGICAL HISTORY  patient denies any surgical history    FAMILY HISTORY  No family history on file.    SOCIAL HISTORY  Social History     Tobacco Use    Smoking status: Not on file    Smokeless tobacco: Not on file   Substance and Sexual Activity    Alcohol use: Not on file    Drug use: Not on file    Sexual activity: Not on file       CURRENT MEDICATIONS  Home Medications       Reviewed by Magalys Eagle R.N. (Registered Nurse) on 09/18/24 at 1435  Med List Status: Partial     Medication Last Dose Status        Patient Dennis Taking any Medications                           ALLERGIES  No Known Allergies    PHYSICAL EXAM  VITAL SIGNS: /74   Pulse 89   Temp 36.1 °C (97 °F) (Temporal)   Resp 18   Ht 1.727 m (5' 8\")   Wt 82.3 kg (181 lb 7 oz)   SpO2 94%   BMI 27.59 kg/m²    In general the patient appears ill    Facial exam please see the social media image below      Ears right tympanic membrane cannot be visualized due to canal stenosis.  The left tympanic membrane is intact, nares and mouth are moist without evidence of soft tissue lesions    Pulmonary the patient's lungs are clear " to auscultation bilaterally    Cardiovascular S1-S2 with a regular rate and rhythm    GI abdomen soft    Skin the fungating lesions described above to the face he also has 1 in the right temple region that is noted in the media image and then another 1 in the left temple region.    Extremities atraumatic    Neurologic examination is grossly intact    EKG/LABS  Results for orders placed or performed during the hospital encounter of 09/18/24   CBC WITH DIFFERENTIAL   Result Value Ref Range    WBC 31.0 (H) 4.8 - 10.8 K/uL    RBC 3.01 (L) 4.70 - 6.10 M/uL    Hemoglobin 8.5 (L) 14.0 - 18.0 g/dL    Hematocrit 27.4 (L) 42.0 - 52.0 %    MCV 91.0 81.4 - 97.8 fL    MCH 28.2 27.0 - 33.0 pg    MCHC 31.0 (L) 32.3 - 36.5 g/dL    RDW 45.7 35.9 - 50.0 fL    Platelet Count 561 (H) 164 - 446 K/uL    MPV 8.3 (L) 9.0 - 12.9 fL    Neutrophils-Polys 82.50 (H) 44.00 - 72.00 %    Lymphocytes 10.90 (L) 22.00 - 41.00 %    Monocytes 4.50 0.00 - 13.40 %    Eosinophils 1.10 0.00 - 6.90 %    Basophils 0.30 0.00 - 1.80 %    Immature Granulocytes 0.70 0.00 - 0.90 %    Nucleated RBC 0.00 0.00 - 0.20 /100 WBC    Neutrophils (Absolute) 25.53 (H) 1.82 - 7.42 K/uL    Lymphs (Absolute) 3.38 1.00 - 4.80 K/uL    Monos (Absolute) 1.38 (H) 0.00 - 0.85 K/uL    Eos (Absolute) 0.35 0.00 - 0.51 K/uL    Baso (Absolute) 0.10 0.00 - 0.12 K/uL    Immature Granulocytes (abs) 0.21 (H) 0.00 - 0.11 K/uL    NRBC (Absolute) 0.00 K/uL   COMP METABOLIC PANEL   Result Value Ref Range    Sodium 137 135 - 145 mmol/L    Potassium 4.1 3.6 - 5.5 mmol/L    Chloride 103 96 - 112 mmol/L    Co2 24 20 - 33 mmol/L    Anion Gap 10.0 7.0 - 16.0    Glucose 94 65 - 99 mg/dL    Bun 14 8 - 22 mg/dL    Creatinine 0.81 0.50 - 1.40 mg/dL    Calcium 11.2 (H) 8.5 - 10.5 mg/dL    Correct Calcium 11.6 (H) 8.5 - 10.5 mg/dL    AST(SGOT) 17 12 - 45 U/L    ALT(SGPT) 13 2 - 50 U/L    Alkaline Phosphatase 110 (H) 30 - 99 U/L    Total Bilirubin 0.3 0.1 - 1.5 mg/dL    Albumin 3.5 3.2 - 4.9 g/dL    Total  Protein 6.7 6.0 - 8.2 g/dL    Globulin 3.2 1.9 - 3.5 g/dL    A-G Ratio 1.1 g/dL   PROTHROMBIN TIME   Result Value Ref Range    PT 14.1 12.0 - 14.6 sec    INR 1.08 0.87 - 1.13   APTT   Result Value Ref Range    APTT 26.5 24.7 - 36.0 sec   ESTIMATED GFR   Result Value Ref Range    GFR (CKD-EPI) 91 >60 mL/min/1.73 m 2         COURSE & MEDICAL DECISION MAKING    This is 74-year-old gentleman who presents with a concerning fungating lesion to the right side of the face that also and feels the earlobe.  I suspect this is a cancerous lesion.  I did order a CT scan of the head as well as soft tissue neck to help determine the depth of the lesion.  Laboratory analysis does show a slight anemia I suspect is from the oozing from the lesion itself.  He does have a concerning leukocytosis and does have some drainage and we will treat the patient for possible infectious process with clindamycin.  The patient will require admission to the hospitalist with oncology in consultation as well as plastic surgery versus ENT for evaluation for possible resection.  The patient is protecting his airway and he does not have any stridor nor difficulty with swallowing..    FINAL DIAGNOSIS  1.  Large fungating mass to the right face and right earlobe  2.  Anemia    Disposition  The patient will be admitted in stable condition     Electronically signed by: Vlad Soto M.D., 9/18/2024 5:26 PM

## 2024-09-19 NOTE — ASSESSMENT & PLAN NOTE
Suspect blood loss anemia in setting of oozing facial mass   -Iron panel and ferritin for further assessment   -Trend while inpt   Recent Labs     09/19/24  0012 09/20/24  0355 09/21/24  0616   HEMOGLOBIN 8.5* 7.7* 7.6*   HEMATOCRIT 27.4* 24.8* 25.0*   MCV 90.1 92.2 92.9   MCH 28.0 28.6 28.3   PLATELETCT 528* 528* 523*       No active bleed. Mixed iron deficiency; I will order iron supplementation

## 2024-09-19 NOTE — ED NOTES
Medication history reviewed with patient at bedside.   Med rec is complete  Allergies reviewed.     Patient has not had any outpatient antibiotics in the last 30 days.   Anticoagulants: No    [place]

## 2024-09-19 NOTE — H&P
[facility] History & Physical Note    Date of Service  9/19/2024    [facility] Team: [facility]   Attending: Chadd Jasso D.o.  Senior Resident: Dr. Jt Phoenix   Contact Number: [phone]    Primary Care Physician  Pcp Pt States None    Consultants    Code Status  DNAR/DNI    Chief Complaint  Chief Complaint   Patient presents with    Sent by MD     Pt ambulated to triage sent by Skin Specialist to get evaluation for skin lesion in right side of face. Pt states that he had it for a year.        History of Presenting Illness (HPI):   Jean Holland is a 74 y.o. male w/ unknown PMH (has not seen a physician before) who presented 9/18/2024 with large right sided fungating facial mass. Patient said the mass had been present about 7 years but rapidly progressed over past 1 year. He said that it is painful, described as a shocking maybe burning pain and rated it as a 7-8/10 that goes to a 2/10 at best. He had some relief of his pain with OTC ibuprofen but said it would still keep him up at night. ROS negative other than R facial pain (see full ROS below). Thankful patient has maintained airway- denied any trouble breathing. He had been seeing a homeopath who [facility]. When seen at the [facility] he was transferred here for higher level of care as he will require extensive resection.     On presenting to the ED patient afebrile with HR 70s-90s, RR 18, BP 130s/70s, and O2 90%s on room air. Labs notable for WBCs 31.0, Hgb 8.5 and calcium 11.6. CT head notable for very large malignant right-sided periauricular mass most likely SCC. Patient was then admitted for further workup and treatment.     Code status reviewed- patient expressed adamantly that he would like to be DNR/DNI and that he would not be in the hospital if his sx weren't so bad. His emergency contact would be his significant other Gertrudis Jay ([phone]) or his son (did not have son's phone number  available).     I discussed the plan of care with patient and attending physician .    Review of Systems  Review of Systems   Constitutional:  Negative for chills, fever, malaise/fatigue and weight loss.   HENT:  Positive for ear pain.    Cardiovascular:  Positive for leg swelling. Negative for chest pain and palpitations.   Gastrointestinal:  Negative for abdominal pain, constipation, diarrhea, heartburn, nausea and vomiting.   Genitourinary:  Negative for dysuria and urgency.   Musculoskeletal:  Negative for back pain and myalgias.   Neurological:  Negative for dizziness, weakness and headaches.       Past Medical History   has no past medical history on file.    Surgical History   has no past surgical history on file.     Family History  family history is not on file.   Family history reviewed with patient.     Social History  Tobacco: none  Alcohol: none  Recreational drugs (illegal or prescription): none  Employment: retired  Living Situation: at home alone   Recent Travel: none  Primary Care Provider: Reviewed None- patient has not seen a physician before.  Other (stressors, spirituality, exposures): none (other than what is in HPI)    Allergies  No Known Allergies    Medications  Prior to Admission Medications   Prescriptions Last Dose Informant Patient Reported? Taking?   ibuprofen (MOTRIN) 200 MG Tab 9/18/2024 at am Patient Yes Yes   Sig: Take 200 mg by mouth every 6 hours as needed for Mild Pain.      Facility-Administered Medications: None       Physical Exam  Temp:  [36.1 °C (97 °F)-36.5 °C (97.7 °F)] 36.1 °C (97 °F)  Pulse:  [79-96] 91  Resp:  [18] 18  BP: (129-154)/(64-75) 129/75  SpO2:  [94 %-99 %] 98 %  Blood Pressure : 135/74   Temperature: 36.1 °C (97 °F)   Pulse: 79   Respiration: 18   Pulse Oximetry: 97 %       Physical Exam  Vitals and nursing note reviewed.   Constitutional:       General: He is not in acute distress.     Appearance: He is ill-appearing.   HENT:      Head:      Comments: Large  "foul smelling fungating R facial mass. Smaller R upper forehead mass. Smaller L temporal facial mass. Please see media tab imaging.   Cardiovascular:      Rate and Rhythm: Normal rate and regular rhythm.      Pulses: Normal pulses.      Heart sounds: Normal heart sounds. No murmur heard.     No friction rub. No gallop.   Pulmonary:      Effort: Pulmonary effort is normal. No respiratory distress.      Breath sounds: Normal breath sounds. No stridor. No wheezing, rhonchi or rales.   Abdominal:      General: Abdomen is flat. Bowel sounds are normal. There is no distension.      Palpations: Abdomen is soft.      Tenderness: There is no abdominal tenderness. There is no guarding or rebound.   Musculoskeletal:      Right lower leg: Edema present.      Left lower leg: Edema present.      Comments: 1+ pitting edema bilat lower extremities.    Skin:     General: Skin is warm and dry.      Coloration: Skin is pale. Skin is not jaundiced.      Findings: Lesion present.   Neurological:      Mental Status: He is alert and oriented to person, place, and time.         Laboratory:  Recent Labs     09/18/24  1740   WBC 31.0*   RBC 3.01*   HEMOGLOBIN 8.5*   HEMATOCRIT 27.4*   MCV 91.0   MCH 28.2   MCHC 31.0*   RDW 45.7   PLATELETCT 561*   MPV 8.3*     Recent Labs     09/18/24  1740   SODIUM 137   POTASSIUM 4.1   CHLORIDE 103   CO2 24   GLUCOSE 94   BUN 14   CREATININE 0.81   CALCIUM 11.2*     Recent Labs     09/18/24  1740   ALTSGPT 13   ASTSGOT 17   ALKPHOSPHAT 110*   TBILIRUBIN 0.3   GLUCOSE 94     Recent Labs     09/18/24  1740   APTT 26.5   INR 1.08     No results for input(s): \"NTPROBNP\" in the last 72 hours.      No results for input(s): \"TROPONINT\" in the last 72 hours.    Imaging:  CT-SOFT TISSUE NECK WITH   Final Result      1.  Large RIGHT facial and neck mass involving both the external ear and the external auditory canal, likely neoplastic. This obscures the RIGHT parotid gland entirely.   2.  Small cutaneous or " subcutaneous nodule overlying the LEFT side diaphragmatic arch   3.  No abnormally enlarged lymph nodes in the neck.      CT-HEAD W/O   Final Result         1. 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 There is much smaller 1.4 cm irregular    soft tissue density in the right anterior superior frontal subcutaneous soft tissues also consistent with malignancy.      2. Age-related cerebral atrophy.             CT-HEAD W/O    Result Date: 9/18/2024  1. 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 There is much smaller 1.4 cm irregular soft tissue density in the right anterior superior frontal subcutaneous soft tissues also consistent with malignancy. 2. Age-related cerebral atrophy.     CT-SOFT TISSUE NECK WITH    Result Date: 9/18/2024  1.  Large RIGHT facial and neck mass involving both the external ear and the external auditory canal, likely neoplastic. This obscures the RIGHT parotid gland entirely. 2.  Small cutaneous or subcutaneous nodule overlying the LEFT side diaphragmatic arch 3.  No abnormally enlarged lymph nodes in the neck.        Assessment/Plan:  Problem Representation: Jean Holland is a 74 y.o. male w/ unknown PMH (has not seen a physician before) who presented 9/18/2024 with large right sided facial mass, likely SCC.   I anticipate this patient will require at least two midnights for appropriate medical management, necessitating inpatient admission because of need for close monitoring, oncology and surgical evaluation    Patient will need a Med/Surg bed on MEDICAL service .  The need is secondary to need for close monitoring as well as need for oncology and surgical  evaluation.    * Facial mass- (present on admission)  Assessment & Plan  CT with very large malignant R sided periauricular mass most likely SCC. Signs of infection with visible purulent discharge and WBCs >30.   -Pt w/o signs of sepsis but due to significant WBC elevation and severity of presentation, ordered BCX prior to initiating abx.   -Unasyn and linezolid- de-escalate pending MRSA nares  -Oncology consult in AM   -Plastic surgery vs ENT consult in AM     Hypercalcemia  Assessment & Plan  Suspect hypercalcemia of malignancy   -PTH with morning labs to further eval for other causes   -IVF      Normocytic anemia  Assessment & Plan  Suspect blood loss anemia in setting of oozing facial mass   -Iron panel and ferritin for further assessment   -Trend while inpt         VTE prophylaxis: SCDs/TEDs and enoxaparin ppx

## 2024-09-20 LAB
25(OH)D3 SERPL-MCNC: 14 NG/ML (ref 30–100)
ALBUMIN SERPL BCP-MCNC: 3 G/DL (ref 3.2–4.9)
BUN SERPL-MCNC: 16 MG/DL (ref 8–22)
CALCIUM ALBUM COR SERPL-MCNC: 11.9 MG/DL (ref 8.5–10.5)
CALCIUM SERPL-MCNC: 11.1 MG/DL (ref 8.5–10.5)
CHLORIDE SERPL-SCNC: 108 MMOL/L (ref 96–112)
CO2 SERPL-SCNC: 23 MMOL/L (ref 20–33)
CREAT SERPL-MCNC: 1.03 MG/DL (ref 0.5–1.4)
ERYTHROCYTE [DISTWIDTH] IN BLOOD BY AUTOMATED COUNT: 46.3 FL (ref 35.9–50)
GFR SERPLBLD CREATININE-BSD FMLA CKD-EPI: 75 ML/MIN/1.73 M 2
GLUCOSE SERPL-MCNC: 115 MG/DL (ref 65–99)
HCT VFR BLD AUTO: 24.8 % (ref 42–52)
HGB BLD-MCNC: 7.7 G/DL (ref 14–18)
MCH RBC QN AUTO: 28.6 PG (ref 27–33)
MCHC RBC AUTO-ENTMCNC: 31 G/DL (ref 32.3–36.5)
MCV RBC AUTO: 92.2 FL (ref 81.4–97.8)
PATHOLOGY CONSULT NOTE: NORMAL
PHOSPHATE SERPL-MCNC: 2.8 MG/DL (ref 2.5–4.5)
PLATELET # BLD AUTO: 528 K/UL (ref 164–446)
PMV BLD AUTO: 8.3 FL (ref 9–12.9)
POTASSIUM SERPL-SCNC: 4.4 MMOL/L (ref 3.6–5.5)
PTH-INTACT SERPL-MCNC: 2.5 PG/ML (ref 14–72)
RBC # BLD AUTO: 2.69 M/UL (ref 4.7–6.1)
SODIUM SERPL-SCNC: 139 MMOL/L (ref 135–145)
WBC # BLD AUTO: 24.6 K/UL (ref 4.8–10.8)

## 2024-09-20 PROCEDURE — 83970 ASSAY OF PARATHORMONE: CPT

## 2024-09-20 PROCEDURE — 85027 COMPLETE CBC AUTOMATED: CPT

## 2024-09-20 PROCEDURE — 99233 SBSQ HOSP IP/OBS HIGH 50: CPT | Performed by: INTERNAL MEDICINE

## 2024-09-20 PROCEDURE — 88305 TISSUE EXAM BY PATHOLOGIST: CPT

## 2024-09-20 PROCEDURE — 99418 PROLNG IP/OBS E/M EA 15 MIN: CPT | Performed by: INTERNAL MEDICINE

## 2024-09-20 PROCEDURE — 700102 HCHG RX REV CODE 250 W/ 637 OVERRIDE(OP): Performed by: INTERNAL MEDICINE

## 2024-09-20 PROCEDURE — 82306 VITAMIN D 25 HYDROXY: CPT

## 2024-09-20 PROCEDURE — 99253 IP/OBS CNSLTJ NEW/EST LOW 45: CPT | Performed by: SURGERY

## 2024-09-20 PROCEDURE — 700111 HCHG RX REV CODE 636 W/ 250 OVERRIDE (IP): Mod: JZ | Performed by: INTERNAL MEDICINE

## 2024-09-20 PROCEDURE — A9270 NON-COVERED ITEM OR SERVICE: HCPCS | Performed by: INTERNAL MEDICINE

## 2024-09-20 PROCEDURE — 36415 COLL VENOUS BLD VENIPUNCTURE: CPT

## 2024-09-20 PROCEDURE — 80069 RENAL FUNCTION PANEL: CPT

## 2024-09-20 PROCEDURE — 700101 HCHG RX REV CODE 250: Performed by: INTERNAL MEDICINE

## 2024-09-20 PROCEDURE — 770006 HCHG ROOM/CARE - MED/SURG/GYN SEMI*

## 2024-09-20 RX ADMIN — FERROUS SULFATE TAB 325 MG (65 MG ELEMENTAL FE) 325 MG: 325 (65 FE) TAB at 09:32

## 2024-09-20 RX ADMIN — OXYCODONE HYDROCHLORIDE 5 MG: 5 TABLET ORAL at 13:04

## 2024-09-20 RX ADMIN — ACETAMINOPHEN 650 MG: 325 TABLET ORAL at 17:33

## 2024-09-20 RX ADMIN — Medication 1 EACH: at 17:33

## 2024-09-20 RX ADMIN — OXYCODONE HYDROCHLORIDE 5 MG: 5 TABLET ORAL at 17:32

## 2024-09-20 RX ADMIN — ACETAMINOPHEN 650 MG: 325 TABLET ORAL at 13:04

## 2024-09-20 RX ADMIN — AMOXICILLIN AND CLAVULANATE POTASSIUM 1 TABLET: 875; 125 TABLET, FILM COATED ORAL at 17:32

## 2024-09-20 RX ADMIN — Medication 1 EACH: at 05:21

## 2024-09-20 RX ADMIN — FERROUS SULFATE TAB 325 MG (65 MG ELEMENTAL FE) 325 MG: 325 (65 FE) TAB at 17:33

## 2024-09-20 RX ADMIN — HYDROMORPHONE HYDROCHLORIDE 0.25 MG: 1 INJECTION, SOLUTION INTRAMUSCULAR; INTRAVENOUS; SUBCUTANEOUS at 07:21

## 2024-09-20 RX ADMIN — OXYCODONE HYDROCHLORIDE 5 MG: 5 TABLET ORAL at 09:36

## 2024-09-20 RX ADMIN — AMOXICILLIN AND CLAVULANATE POTASSIUM 1 TABLET: 875; 125 TABLET, FILM COATED ORAL at 05:21

## 2024-09-20 RX ADMIN — OXYCODONE HYDROCHLORIDE 5 MG: 5 TABLET ORAL at 05:21

## 2024-09-20 RX ADMIN — ACETAMINOPHEN 650 MG: 325 TABLET ORAL at 05:21

## 2024-09-20 ASSESSMENT — ENCOUNTER SYMPTOMS
HEMOPTYSIS: 0
EYE DISCHARGE: 0
CLAUDICATION: 0
BACK PAIN: 0
VOMITING: 0
COUGH: 0
HEADACHES: 0
ORTHOPNEA: 0
SEIZURES: 0
NAUSEA: 0
DIARRHEA: 0
DIZZINESS: 0
WEAKNESS: 0
WHEEZING: 0
NECK PAIN: 0
EYE REDNESS: 0
ABDOMINAL PAIN: 0
SENSORY CHANGE: 0
SHORTNESS OF BREATH: 0
FOCAL WEAKNESS: 0
SPEECH CHANGE: 0
TREMORS: 0
EYE PAIN: 0
PALPITATIONS: 0
DEPRESSION: 0
HEARTBURN: 0
NERVOUS/ANXIOUS: 0
LOSS OF CONSCIOUSNESS: 0
SPUTUM PRODUCTION: 0
TINGLING: 0
FEVER: 0
BRUISES/BLEEDS EASILY: 0
BLOOD IN STOOL: 0
INSOMNIA: 0
HALLUCINATIONS: 0
DOUBLE VISION: 0
CHILLS: 0
BLURRED VISION: 0
CONSTIPATION: 0
PHOTOPHOBIA: 0
WEIGHT LOSS: 0
MYALGIAS: 0
FALLS: 0

## 2024-09-20 ASSESSMENT — PAIN DESCRIPTION - PAIN TYPE
TYPE: ACUTE PAIN
TYPE: ACUTE PAIN

## 2024-09-20 ASSESSMENT — LIFESTYLE VARIABLES: SUBSTANCE_ABUSE: 0

## 2024-09-20 NOTE — CONSULTS
Surgery Otolaryngology Consultation    Date of Service  9/20/2024    Referring Physician  Brock Grossman M.D.    Consulting Physician  Jose J Willams M.D.    Reason for Consultation  Right facial mass    History of Presenting Illness  74 y.o. male who presented 9/18/2024 with patient states that he started to have a skin lesion just anterior to his ear on his cheek couple years ago.  It has been growing that whole time he states that normally he lets his body heal itself but unfortunately this has worsened and worsened.  He has been struggling with hearing out of his right ear as well as some bleeding of the wound itself.  He presented to the emergency department yesterday for further evaluation and treatment.    Review of Systems  ROS    Past Medical History   has no past medical history on file.    Surgical History   has no past surgical history on file.    Family History  family history is not on file.    Social History   reports that he quit smoking about 50 years ago. His smoking use included cigarettes. He started smoking about 56 years ago. He has a 3 pack-year smoking history. He has never used smokeless tobacco. He reports that he does not use drugs.    Medications  Current Facility-Administered Medications   Medication Dose Route Frequency Provider Last Rate Last Admin    bacitracin-polymyxin b (Polysporin) [id number] UNIT/GM ointment   Topical BID Brock Grossman M.D.   1 Each at 09/20/24 0521    amoxicillin-clavulanate (Augmentin) 875-125 MG per tablet 1 Tablet  1 Tablet Oral Q12HRS Brock Grossman M.D.   1 Tablet at 09/20/24 0521    Pharmacy Consult Request ...Pain Management Review 1 Each  1 Each Other PHARMACY TO DOSE Brock Grossman M.D.        acetaminophen (Tylenol) tablet 650 mg  650 mg Oral Q6HRS Brock Grossman M.D.   650 mg at 09/20/24 1304    Followed by    [START ON 9/24/2024] acetaminophen (Tylenol) tablet 650 mg  650 mg Oral Q6HRS PRN Brock Grossman M.D.        oxyCODONE  immediate-release (Roxicodone) tablet 2.5 mg  2.5 mg Oral Q3HRS PRN Brock Grossman M.D.        Or    oxyCODONE immediate-release (Roxicodone) tablet 5 mg  5 mg Oral Q3HRS PRN Brock Grossman M.D.   5 mg at 09/20/24 1304    Or    HYDROmorphone (Dilaudid) injection 0.25 mg  0.25 mg Intravenous Q3HRS PRN Brock Grossman M.D.   0.25 mg at 09/20/24 0721    ferrous sulfate tablet 325 mg  325 mg Oral BID WITH MEALS Brock Grossman M.D.   325 mg at 09/20/24 0932    [Held by provider] enoxaparin (Lovenox) inj 40 mg  40 mg Subcutaneous DAILY AT 1800 tJ Phoenix D.O.        senna-docusate (Pericolace Or Senokot S) 8.6-50 MG per tablet 2 Tablet  2 Tablet Oral Q EVENING Jt Phoenix D.O.   2 Tablet at 09/19/24 1707    And    polyethylene glycol/lytes (Miralax) Packet 1 Packet  1 Packet Oral QDAY PRN Jt Phoenix D.O.        labetalol (Normodyne/Trandate) injection 10 mg  10 mg Intravenous Q4HRS PRN Jt Phoenix D.O.        NS infusion   Intravenous Continuous Jt Phoenix D.O. 100 mL/hr at 09/19/24 2222 New Bag at 09/19/24 2222       Allergies  No Known Allergies    Physical Exam  Temp:  [36.5 °C (97.7 °F)-36.8 °C (98.3 °F)] 36.5 °C (97.7 °F)  Pulse:  [87-96] 87  Resp:  [15-19] 15  BP: (102-121)/(55-62) 102/56  SpO2:  [90 %-95 %] 90 %    Physical Exam    General: Alert and oriented x 3, no acute distress  Head: Normocephalic atraumatic  Face: There is a large roughly 20 cm right facial exophytic fungating mass involving his inferior portion of his ear including the ear lobule in the consul bowl as well as the tragus anteriorly extending out onto his cheek over the masseter muscle extending onto his neck at the angle of the mandible in the level 2 area and into the posterior portion of his neck over the sternocleidomastoid and the mastoid itself.  There is a second lesion in the right forehead approximately 2 cm in diameter exophytic in nature that is likely related and possible dermal  metastasis  Eyes: PERRLA, EOMI  Ears: External pinna normally formed with no discharge or drainage  Oral cavity oropharynx: Unremarkable with symmetric soft palate, clear saliva  Neck: No masses or lymphadenopathy trachea is in the midline  Abdomen: Soft nontender nondistended  Respiratory: Normal quiet respirations no stridor no stridor normal voice  Extremities: Moving all extremities well  Lymphatics: No pedal edema    Procedure: The anterior edge of the fungating lesion near the skin was biopsied in 3 locations using 3 bite forceps and placed in formalin and given to nursing staff today.  Hemostasis was achieved with some pressure.    Fluids  Date 09/20/24 0700 - 09/21/24 0659   Shift 7455-7401 5657-2402 0980-4762 24 Hour Total   INTAKE   P.O. 120   120   Shift Total 120   120   OUTPUT   Shift Total       Weight (kg) 82.3 82.3 82.3 82.3       Laboratory  Recent Labs     09/18/24  1740 09/19/24  0012 09/20/24  0355   WBC 31.0* 28.2* 24.6*   RBC 3.01* 3.04* 2.69*   HEMOGLOBIN 8.5* 8.5* 7.7*   HEMATOCRIT 27.4* 27.4* 24.8*   MCV 91.0 90.1 92.2   MCH 28.2 28.0 28.6   MCHC 31.0* 31.0* 31.0*   RDW 45.7 45.1 46.3   PLATELETCT 561* 528* 528*   MPV 8.3* 8.5* 8.3*     Recent Labs     09/19/24  0140 09/19/24  1959 09/20/24  0355   SODIUM 139 140 139   POTASSIUM 3.9 3.9 4.4   CHLORIDE 104 108 108   CO2 22 23 23   GLUCOSE 79 135* 115*   BUN 12 12 16   CREATININE 0.72 1.01 1.03   CALCIUM 10.9* 10.8* 11.1*     Recent Labs     09/18/24  1740   APTT 26.5   INR 1.08                 Imaging  CT-SOFT TISSUE NECK WITH   Final Result      1.  Large RIGHT facial and neck mass involving both the external ear and the external auditory canal, likely neoplastic. This obscures the RIGHT parotid gland entirely.   2.  Small cutaneous or subcutaneous nodule overlying the LEFT side diaphragmatic arch   3.  No abnormally enlarged lymph nodes in the neck.      CT-HEAD W/O   Final Result         1. Very large malignant right-sided periauricular  mass which invades the right ear obliterates the right external auditory canal and extends into the adjacent subcutaneous tissues likely squamous cell carcinoma. There is much smaller 1.4 cm irregular    soft tissue density in the right anterior superior frontal subcutaneous soft tissues also consistent with malignancy.      2. Age-related cerebral atrophy.             Assessment/Plan  Right facial mass-see photos    We discussed the size of the lesion and its location and the structures that it is abutting and surrounding including his mastoid, parotid, facial nerve, and abutting the carotid artery.  We discussed the likely diagnosis of cancer such as squamous cell carcinoma of the skin.  At this size he is inoperable and I would recommend chemoradiation for likely palliative care.  Depending on how he responds he may become an operable candidate.  He may require referral for free flap treatment at that time.  Recommend referrals to oncology and chemotherapy for palliation.  Biopsies were taken today.

## 2024-09-20 NOTE — DISCHARGE PLANNING
Care Transition Team Assessment    LMSW met with the patient at bedside to complete assessment. Pt A&Ox4 and able to verify the information on the face sheet. Patient lives at [facility] [address]. The patient does not have a PCP. The patient does not have any Advanced Care Documents scanned into [facility]. The patient does not have any MATHEWS or MH concerns.     LMSW met with the patient at bedside to discuss DC needs such as Home Health and wound care. Currently the patient is declining Home Health. The patient is open to wound care in [place] only. LMSW set up the patient with a PCP per the patient request. Appointment made for Wednesday September 25th at 1045am at [address].     LMSW spoke with [facility]. [facility] wound [facility] clinic does not have any openings for three weeks. Referral to be faxed to [phone].     Information Source  Orientation Level: Oriented X4  Information Given By: Patient  Who is responsible for making decisions for patient? : Patient    Readmission Evaluation  Is this a readmission?: No    Elopement Risk  Legal Hold: No  Ambulatory or Self Mobile in Wheelchair: Yes  Disoriented: No  Psychiatric Symptoms: None  History of Wandering: No  Elopement this Admit: No  Vocalizing Wanting to Leave: No  Displays Behaviors, Body Language Wanting to Leave: No-Not at Risk for Elopement    Interdisciplinary Discharge Planning  Lives with - Patient's Self Care Capacity: Alone and Able to Care For Self  Patient or legal guardian wants to designate a caregiver: No  Support Systems: Spouse / Significant Other  Housing / Facility: [address]    Discharge Preparedness  What is your plan after discharge?: Home with help  What are your discharge supports?: Spouse  Prior Functional Level: Independent with Activities of Daily Living, Independent with Medication Management  Difficulity with ADLs: None  Difficulity with IADLs:  None    Functional Assesment  Prior Functional Level: Independent with Activities of Daily Living, Independent with Medication Management    Finances  Financial Barriers to Discharge: No  Prescription Coverage: Yes    Vision / Hearing Impairment  Vision Impairment : No  Hearing Impairment : No  Hearing Impairment: Right Ear    Advance Directive  Advance Directive?: None    Domestic Abuse  Have you ever been the victim of abuse or violence?: No  Possible Abuse/Neglect Reported to:: Not Applicable    Psychological Assessment  History of Substance Abuse: None  History of Psychiatric Problems: No  Non-compliant with Treatment: No  Newly Diagnosed Illness: No    Discharge Risks or Barriers  Discharge risks or barriers?: No    Anticipated Discharge Information  Discharge Disposition: Discharged to home/self care (01)

## 2024-09-20 NOTE — DISCHARGE PLANNING
Case Management Discharge Planning    Admission Date: 9/18/2024  GMLOS: 3  ALOS: 2    6-Clicks ADL Score: 24  6-Clicks Mobility Score: 24      Anticipated Discharge Dispo: Discharge Disposition: Discharged to home/self care (01)    DME Needed: No    Action(s) Taken: LMSW met with the patient at bedside to complete an IMM. IMM scanned to [facility]PA and Charted.    Addendum @ 2245: LMSW met with the patient at bedside with family. LMSW made the patient aware of the barriers for Wound Care. Patient is deciding between wound care here at [facility], Home Health wound care, and self wound care.     Escalations Completed: None    Medically Clear: Yes    Next Steps: Patient to DC home.     Barriers to Discharge: None    Is the patient up for discharge tomorrow: No

## 2024-09-20 NOTE — CONSULTS
Date of Service  September 20, 2024     Reason for Consult: Fungating mass right face    Requested by: Brock Grossman MD    Location: [id number]    Chief Complaint  Sent by MD (Pt ambulated to triage sent by Skin Specialist to get evaluation for skin lesion in right side of face. Pt states that he had it for a year. )        HPI: Patient is a 74 otherwise healthy individual who presented to the ER with large fungating mass on the right face.  He states this has been present for a few years and slowly grown over time.  It has started growing more rapidly over the last few months.  He also has an additional lesion on the right forehead.  He has noted more frequent bleeding from the area recently.  He also has been having more pain in the area.  He underwent imaging showing a large invasive mass involving the external auditory canal and external ear extending over the right face.  No henrique disease noted.  He underwent a biopsy by ENT today for diagnosis.    Past Medical History  No past medical history on file.    Past Surgical History  No past surgical history on file.    Current Medications:   Home Medications    Medication Sig Taking? Last Dose Authorizing Provider   ibuprofen (MOTRIN) 200 MG Tab Take 200 mg by mouth every 6 hours as needed for Mild Pain. Yes 9/18/2024 at am Physician Outpatient         Allergies:   No Known Allergies    Problem List  Principal Problem:    Facial mass (POA: Yes)  Active Problems:    Normocytic anemia (POA: Unknown)    Hypercalcemia (POA: Unknown)  Resolved Problems:    * No resolved hospital problems. *       Subjective  Review of Systems   Constitutional:  Negative for chills, fever, malaise/fatigue and weight loss.   HENT:  Negative for congestion, ear discharge, ear pain, hearing loss and nosebleeds.         Facial pain and bleeding   Eyes:  Negative for blurred vision, double vision, photophobia, pain and discharge.   Respiratory:  Negative for cough, hemoptysis and sputum  production.    Cardiovascular:  Negative for chest pain, palpitations, orthopnea and claudication.   Gastrointestinal:  Negative for abdominal pain, constipation, diarrhea, heartburn, nausea and vomiting.   Genitourinary:  Negative for dysuria, frequency, hematuria and urgency.   Musculoskeletal:  Negative for back pain, joint pain, myalgias and neck pain.   Skin:  Negative for itching and rash.   Neurological:  Negative for dizziness, tingling, tremors, sensory change, speech change, focal weakness and headaches.   Endo/Heme/Allergies:  Negative for environmental allergies. Does not bruise/bleed easily.   Psychiatric/Behavioral:  Negative for depression, hallucinations, substance abuse and suicidal ideas. The patient is not nervous/anxious.          Objective  Temp:  [36.5 °C (97.7 °F)-36.8 °C (98.3 °F)] 36.7 °C (98.1 °F)  Pulse:  [87-96] 91  Resp:  [15-19] 16  BP: (102-130)/(55-64) 130/64  SpO2:  [90 %-94 %] 92 %      Physical Exam  Constitutional:       General: He is not in acute distress.     Appearance: Normal appearance. He is not ill-appearing.   HENT:      Head:      Comments: Large >15 cm fungating, necrotic mass involving the right external ear, right face extending from the zygomatic arch down to the mandible.  Second lesion over the right forehead about 2 cm in size.  Cardiovascular:      Rate and Rhythm: Normal rate and regular rhythm.   Pulmonary:      Effort: Pulmonary effort is normal. No respiratory distress.   Musculoskeletal:         General: No swelling, tenderness or deformity.      Cervical back: Neck supple.   Skin:     Coloration: Skin is not jaundiced or pale.      Findings: No bruising or erythema.   Neurological:      Mental Status: He is alert.          Fluids    Intake/Output Summary (Last 24 hours) at 9/20/2024 1642  Last data filed at 9/20/2024 1400  Gross per 24 hour   Intake 240 ml   Output --   Net 240 ml         Labs  Lab Results   Component Value Date/Time    WBC 24.6 (H)  09/20/2024 03:55 AM    RBC 2.69 (L) 09/20/2024 03:55 AM    HEMOGLOBIN 7.7 (L) 09/20/2024 03:55 AM    HEMATOCRIT 24.8 (L) 09/20/2024 03:55 AM    MCV 92.2 09/20/2024 03:55 AM    MCH 28.6 09/20/2024 03:55 AM    MCHC 31.0 (L) 09/20/2024 03:55 AM    MPV 8.3 (L) 09/20/2024 03:55 AM    NEUTSPOLYS 82.50 (H) 09/18/2024 05:40 PM    LYMPHOCYTES 10.90 (L) 09/18/2024 05:40 PM    MONOCYTES 4.50 09/18/2024 05:40 PM    EOSINOPHILS 1.10 09/18/2024 05:40 PM    BASOPHILS 0.30 09/18/2024 05:40 PM        Lab Results   Component Value Date/Time    SODIUM 139 09/20/2024 03:55 AM    POTASSIUM 4.4 09/20/2024 03:55 AM    CHLORIDE 108 09/20/2024 03:55 AM    CO2 23 09/20/2024 03:55 AM    GLUCOSE 115 (H) 09/20/2024 03:55 AM    BUN 16 09/20/2024 03:55 AM    CREATININE 1.03 09/20/2024 03:55 AM        Lab Results   Component Value Date/Time    PROTHROMBTM 14.1 09/18/2024 05:40 PM    INR 1.08 09/18/2024 05:40 PM        Recent Labs     09/18/24  1740 09/19/24  0140   ASTSGOT 17 15   ALTSGPT 13 12   TBILIRUBIN 0.3 0.2   GLOBULIN 3.2 2.5   INR 1.08  --        Imaging  CT-SOFT TISSUE NECK WITH  Narrative: 9/18/2024 7:12 PM    HISTORY/REASON FOR EXAM:  right facial mass.    TECHNIQUE/EXAM DESCRIPTION AND NUMBER OF VIEWS:  CT soft tissue neck with contrast.    The study was performed on a helical multidetector CT scanner. Contiguous thin section helical images were obtained of the neck from the skull base through the thoracic inlet.    90 mL of Omnipaque 350 nonionic contrast was injected intravenously.    Low dose optimization technique was utilized for this CT exam including automated exposure control and adjustment of the mA and/or kV according to patient size.    COMPARISON: None.    FINDINGS:  There is a heterogeneously RIGHT facial fungating and apparently ulcerated mass involving the RIGHT external ear, the internal auditory canal and the RIGHT lateral facial and anterior neck soft tissues. This measures up to 7.3 x 7.3 x 9.4 cm. It abuts   the  lateral RIGHT temporal bone as well as the zygomatic arch but there is no definite bony erosion. This totally obscures the RIGHT parotid gland.  There is an additional cutaneous or subcutaneous nodule overlying the LEFT side zygomatic arch measuring 13 x 8 mm.    BRAIN: Visualized portions of the brain are normal in appearance.    TEMPORAL bones: The mastoid air cells and middle ear are clear.    PARANASAL SINUSES: The paranasal sinuses are clear.    CERVICAL spine: There is no significant cervical spine abnormality.    NODES: There is no adenopathy or mass within the neck.    SALIVARY and THYROID gland: The LEFT parotid, BILATERAL submandibular, and thyroid gland are normal in appearance.    AIRWAY: The airway appears patent.    MEDIASTINUM: The superior mediastinum is normal in appearance.    LUNGS: The visualized portions of lungs are clear.  Impression: 1.  Large RIGHT facial and neck mass involving both the external ear and the external auditory canal, likely neoplastic. This obscures the RIGHT parotid gland entirely.  2.  Small cutaneous or subcutaneous nodule overlying the LEFT side diaphragmatic arch  3.  No abnormally enlarged lymph nodes in the neck.  CT-HEAD W/O  Narrative: 9/18/2024 7:12 PM    HISTORY/REASON FOR EXAM:  Large right periauricular mass.    TECHNIQUE/EXAM DESCRIPTION AND NUMBER OF VIEWS:  CT of the head without contrast.    The study was performed on a helical multidetector CT scanner. Contiguous axial sections were obtained from the skull base through the vertex.    Up to date radiation dose reduction adjustments have been utilized to meet ALARA standards for radiation dose reduction.    COMPARISON:  None available    FINDINGS:    There Is A Large 7.7 X 3.9 Cm Soft Tissue Mass That Arises In The Right Periauricular Region And Invades The Adjacent Soft Tissues and obliterates the right external auditory canal. There is a much smaller irregular elliptical soft tissue mass in the   right  anterior frontal subcutaneous soft tissues.    The calvariae and skull base are unremarkable. There are no extraaxial fluid collections. The ventricular system and basal cisterns are within normal limits. There are no areas of abnormal density in the brain substance. There are no hemorrhagic lesions.   There are no mass effects or shift of midline structures.    The brainstem and posterior fossa structures are unremarkable.    Paranasal sinuses in the field of view are unremarkable.    Mastoids in the field of view are unremarkable.  Impression: 1. 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 There is much smaller 1.4 cm irregular   soft tissue density in the right anterior superior frontal subcutaneous soft tissues also consistent with malignancy.    2. Age-related cerebral atrophy.        Pathology  pending  Principal Problem:    Facial mass (POA: Yes)  Active Problems:    Normocytic anemia (POA: Unknown)    Hypercalcemia (POA: Unknown)  Resolved Problems:    * No resolved hospital problems. *       Assessment and Plan  Patient is a 74M with large fungating necrotic mass of the right face and ear.    I had a long conversation with patient and family today.  His pathology is pending but suspect this may represent a cutaneous malignancy.  Pending the results of the biopsy he is going to need systemic therapy and likely radiation.  In its current state I do not think this is surgically resectable and he would need to have a fairly significant response to therapy to be a surgical candidate.  I recommend a medical oncology consult, he will need a PET scan likely.  Follow up pathology.  I am happy to follow along as an outpatient but most the care will likely be driven by med onc, rad, onc ENT.        The assessment and plan discussed with patient, family,  and hospitalist    John Robles M.D.

## 2024-09-20 NOTE — CARE PLAN
The patient is Stable - Low risk of patient condition declining or worsening    Shift Goals  Clinical Goals: pain management; IV hydration  Patient Goals: biopsy; consults  Family Goals: gaetano    Progress made toward(s) clinical / shift goals:  Pt alert and able to make needs known. Pain medications given as ordered. Bed locked in lowest position. All needs met at this time.     Problem: Pain - Standard  Goal: Alleviation of pain or a reduction in pain to the patient’s comfort goal  Outcome: Progressing     Problem: Knowledge Deficit - Standard  Goal: Patient and family/care givers will demonstrate understanding of plan of care, disease process/condition, diagnostic tests and medications  Outcome: Progressing       Patient is not progressing towards the following goals:

## 2024-09-20 NOTE — PROGRESS NOTES
[facility] Medicine Daily Progress Note    Date of Service  9/20/2024    Chief Complaint  Jean Holland is a 74 y.o. male admitted 9/18/2024 with Sent by MD (Pt ambulated to triage sent by Skin Specialist to get evaluation for skin lesion in right side of face. Pt states that he had it for a year. )    Hospital Course  No notes on file    Interval Problem Update  Patient seen and examine at bedside  75yo M no known medical problems not on prescription medications, no surgical history, presented 9/18/2024 to our ED from [facility] for wourk-up and management of extensive fungating necrotic facial lesion extending to the R face and ear. FIrst reported small lesion inferior to his earlobe 7 years ago bit for the last year progressed. Was seen at [facility] and advised to come to the ED. At the ED, he is afebrile hemodynamically stable. WBC 28.2, Cr- 0.72, Ca 10.2    Managing facial mass possible SCC needs resection and biopsy, hypercalcemia.  WBC 30-24, bld Cx 9/19 NGTD wound culture multifloral. Antibiotics started. IV fluids for mild hypercalcemia (10-11), ordered Vit D level and PTH PENDING.  Will need official blood and wound culture reports, currently antibiotics downgraded to PO as per ID Pharmacy. WOund Team involved, recommended dressing changes. Hb 8.5-7.7. Reviewed iron panel Ordered PO iron.   Aside from the fungating lesion he has other lesions in the other side of the face and forehead, seems like actinic keratoses versus SCC. He denies dizziness, shortness of breath, chest pain or palpitations.   S/p skin biopsy by Dr. Willams, ENT TODAY. I ordered pathology PENDING. His opinion is might be too high risk for resection may just need palliative rads. I spoke with Dr. Contreras, Plastics. He agrees that resection may be difficult. I spoke with Dr. Robles. He agreed that next step would be Oncology to determine chemo and immunotherapy as well as radiation therapy options. Likely  nonoperative. I spoke with Dr. Power who recommends outpatient followup to his Oncology clinic.    I saw him and answered family's questions at length. He also discussed narcotics which I explained, risks, benefits and narcotics contract/refills.  Offer C, Wound care and Wound clinic along with follow ups when cleared.    I reviewed the chart along with vitals, labs, imaging, test (both pending and resulted) and recommendations from specialists and interdisciplinary team.      I have discussed this patient's plan of care and discharge plan at IDT rounds today with Case Management, Nursing, Nursing leadership, and other members of the IDT team.    Consultants/Specialty  ENT  SUrgOnc    Code Status  DNAR/DNI    Disposition  Medically Cleared  I have placed the appropriate orders for post-discharge needs.    Review of Systems  Review of Systems   Constitutional:  Negative for chills and fever.   HENT:  Negative for congestion, hearing loss and nosebleeds.    Eyes:  Negative for pain and redness.   Respiratory:  Negative for cough, hemoptysis, shortness of breath and wheezing.    Cardiovascular:  Negative for chest pain and palpitations.   Gastrointestinal:  Negative for abdominal pain, blood in stool, constipation, diarrhea, nausea and vomiting.   Genitourinary:  Negative for dysuria, frequency and hematuria.   Musculoskeletal:  Negative for falls, joint pain and myalgias.   Skin:  Positive for rash.   Neurological:  Negative for dizziness, tremors, focal weakness, seizures, loss of consciousness, weakness and headaches.   Psychiatric/Behavioral:  The patient is not nervous/anxious and does not have insomnia.    All other systems reviewed and are negative.       Physical Exam  Temp:  [36.5 °C (97.7 °F)-36.8 °C (98.3 °F)] 36.7 °C (98.1 °F)  Pulse:  [87-96] 91  Resp:  [15-19] 16  BP: (102-130)/(55-64) 130/64  SpO2:  [90 %-94 %] 92 %    Physical Exam  Vitals and nursing note reviewed.   Constitutional:       Comments:  Elderly, frail   HENT:      Head: Normocephalic and atraumatic.      Comments: Large fungating, ulcerative/necrotic mass R face extending to ear. Eyes are spared.      Right Ear: External ear normal.      Left Ear: External ear normal.      Nose: Nose normal.      Mouth/Throat:      Mouth: Mucous membranes are moist.   Eyes:      General: No scleral icterus.     Conjunctiva/sclera: Conjunctivae normal.   Cardiovascular:      Rate and Rhythm: Normal rate and regular rhythm.      Heart sounds: No murmur heard.     No friction rub. No gallop.   Pulmonary:      Effort: Pulmonary effort is normal.      Breath sounds: Normal breath sounds.   Abdominal:      General: Abdomen is flat. Bowel sounds are normal. There is no distension.      Palpations: Abdomen is soft.      Tenderness: There is no abdominal tenderness. There is no guarding.   Musculoskeletal:         General: Normal range of motion.      Cervical back: Normal range of motion and neck supple.   Skin:     General: Skin is warm.   Neurological:      Mental Status: He is alert and oriented to person, place, and time. Mental status is at baseline.   Psychiatric:         Mood and Affect: Mood normal.         Behavior: Behavior normal.         Thought Content: Thought content normal.         Judgment: Judgment normal.         Fluids    Intake/Output Summary (Last 24 hours) at 9/20/2024 1620  Last data filed at 9/20/2024 1400  Gross per 24 hour   Intake 240 ml   Output --   Net 240 ml        Laboratory  Recent Labs     09/18/24  1740 09/19/24  0012 09/20/24  0355   WBC 31.0* 28.2* 24.6*   RBC 3.01* 3.04* 2.69*   HEMOGLOBIN 8.5* 8.5* 7.7*   HEMATOCRIT 27.4* 27.4* 24.8*   MCV 91.0 90.1 92.2   MCH 28.2 28.0 28.6   MCHC 31.0* 31.0* 31.0*   RDW 45.7 45.1 46.3   PLATELETCT 561* 528* 528*   MPV 8.3* 8.5* 8.3*     Recent Labs     09/19/24  0140 09/19/24  1959 09/20/24  0355   SODIUM 139 140 139   POTASSIUM 3.9 3.9 4.4   CHLORIDE 104 108 108   CO2 22 23 23   GLUCOSE 79 135*  115*   BUN 12 12 16   CREATININE 0.72 1.01 1.03   CALCIUM 10.9* 10.8* 11.1*     Recent Labs     09/18/24  1740   APTT 26.5   INR 1.08               Imaging  CT-SOFT TISSUE NECK WITH   Final Result      1.  Large RIGHT facial and neck mass involving both the external ear and the external auditory canal, likely neoplastic. This obscures the RIGHT parotid gland entirely.   2.  Small cutaneous or subcutaneous nodule overlying the LEFT side diaphragmatic arch   3.  No abnormally enlarged lymph nodes in the neck.      CT-HEAD W/O   Final Result         1. 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 There is much smaller 1.4 cm irregular    soft tissue density in the right anterior superior frontal subcutaneous soft tissues also consistent with malignancy.      2. Age-related cerebral atrophy.              Assessment/Plan  * Facial mass- (present on admission)  Assessment & Plan  CT with very large malignant R sided periauricular mass most likely SCC. Signs of infection with visible purulent discharge and WBCs >30.   Antibiotics  Spoke with ENT, skin biopsy planned  Spoke with Surg Onc who will see pt.    Hypercalcemia  Assessment & Plan  Suspect hypercalcemia of malignancy   -PTH with morning labs to further eval for other causes   -IVF        Mild  IV fluids  Trend    Normocytic anemia  Assessment & Plan  Suspect blood loss anemia in setting of oozing facial mass   -Iron panel and ferritin for further assessment   -Trend while inpt   Recent Labs     09/18/24  1740 09/19/24  0012   HEMOGLOBIN 8.5* 8.5*   HEMATOCRIT 27.4* 27.4*   MCV 91.0 90.1   MCH 28.2 28.0   PLATELETCT 561* 528*     No active bleed. Mixed iron deficiency; I will order iron supplementation         VTE prophylaxis: Chemoppx prob tomorrow    I have performed a physical exam and reviewed and  updated ROS and Plan today (9/20/2024). In review of yesterday's note (9/19/2024), there are no changes except as documented above.    Patient is has a high medical complexity, complex decision making and is at high risk for complication, morbidity, and mortality, thus requiring the highest level of my preparedness for sudden, emergent intervention. Medical decision making is therefore complex. I provided  services, which included ordering labs and/or imaging, and discussing the case with various consultants.medication orders, frequent reevaluations of the patient's condition and response to treatment. Time was also devoted to counseling and coordinating care including review of records, pertinent lab data and studies, as well as discussing diagnostic evaluation and work up, planned therapeutic interventions and future disposition of care. Where indicated, the assessment and plan reflect discussion of patient with consultants, other healthcare providers, family members, and additional research needed to obtain further information in formulating the plan of care for Jean Holland. Total time spent was 65 minutes.

## 2024-09-20 NOTE — CARE PLAN
The patient is Stable - Low risk of patient condition declining or worsening    Shift Goals  Clinical Goals: Pain mgnmt, IV hydration, wound care, rest  Patient Goals: Pain relief, rest  Family Goals: gaetano    Progress made toward(s) clinical / shift goals:    Assumed care of pt. All medications taken and tolerated, medicated for pain per MAR. IV fluids infusing. Pt resting in bed w/ call light and belongings in reach, bed locked in lowest position, hourly rounding in place. No additional needs at this time.    Problem: Pain - Standard  Goal: Alleviation of pain or a reduction in pain to the patient’s comfort goal  Outcome: Progressing     Problem: Knowledge Deficit - Standard  Goal: Patient and family/care givers will demonstrate understanding of plan of care, disease process/condition, diagnostic tests and medications  Outcome: Progressing

## 2024-09-21 ENCOUNTER — APPOINTMENT (OUTPATIENT)
Dept: RADIOLOGY | Facility: MEDICAL CENTER | Age: 74
DRG: 607 | End: 2024-09-21
Attending: INTERNAL MEDICINE
Payer: MEDICARE

## 2024-09-21 LAB
ALBUMIN SERPL BCP-MCNC: 3.1 G/DL (ref 3.2–4.9)
BACTERIA WND AEROBE CULT: ABNORMAL
BUN SERPL-MCNC: 14 MG/DL (ref 8–22)
CALCIUM ALBUM COR SERPL-MCNC: 11.8 MG/DL (ref 8.5–10.5)
CALCIUM SERPL-MCNC: 11.1 MG/DL (ref 8.5–10.5)
CHLORIDE SERPL-SCNC: 105 MMOL/L (ref 96–112)
CO2 SERPL-SCNC: 18 MMOL/L (ref 20–33)
CREAT SERPL-MCNC: 0.8 MG/DL (ref 0.5–1.4)
ERYTHROCYTE [DISTWIDTH] IN BLOOD BY AUTOMATED COUNT: 46.7 FL (ref 35.9–50)
GFR SERPLBLD CREATININE-BSD FMLA CKD-EPI: 91 ML/MIN/1.73 M 2
GLUCOSE SERPL-MCNC: 100 MG/DL (ref 65–99)
GRAM STN SPEC: ABNORMAL
HCT VFR BLD AUTO: 25 % (ref 42–52)
HGB BLD-MCNC: 7.6 G/DL (ref 14–18)
MCH RBC QN AUTO: 28.3 PG (ref 27–33)
MCHC RBC AUTO-ENTMCNC: 30.4 G/DL (ref 32.3–36.5)
MCV RBC AUTO: 92.9 FL (ref 81.4–97.8)
PHOSPHATE SERPL-MCNC: 2.5 MG/DL (ref 2.5–4.5)
PLATELET # BLD AUTO: 523 K/UL (ref 164–446)
PMV BLD AUTO: 8.5 FL (ref 9–12.9)
POTASSIUM SERPL-SCNC: 4.2 MMOL/L (ref 3.6–5.5)
RBC # BLD AUTO: 2.69 M/UL (ref 4.7–6.1)
SIGNIFICANT IND 70042: ABNORMAL
SITE SITE: ABNORMAL
SODIUM SERPL-SCNC: 137 MMOL/L (ref 135–145)
SOURCE SOURCE: ABNORMAL
WBC # BLD AUTO: 25 K/UL (ref 4.8–10.8)

## 2024-09-21 PROCEDURE — 93970 EXTREMITY STUDY: CPT | Mod: 26 | Performed by: INTERNAL MEDICINE

## 2024-09-21 PROCEDURE — 700101 HCHG RX REV CODE 250: Performed by: INTERNAL MEDICINE

## 2024-09-21 PROCEDURE — 93970 EXTREMITY STUDY: CPT

## 2024-09-21 PROCEDURE — 700102 HCHG RX REV CODE 250 W/ 637 OVERRIDE(OP)

## 2024-09-21 PROCEDURE — 85027 COMPLETE CBC AUTOMATED: CPT

## 2024-09-21 PROCEDURE — 700102 HCHG RX REV CODE 250 W/ 637 OVERRIDE(OP): Performed by: INTERNAL MEDICINE

## 2024-09-21 PROCEDURE — 36415 COLL VENOUS BLD VENIPUNCTURE: CPT

## 2024-09-21 PROCEDURE — 700111 HCHG RX REV CODE 636 W/ 250 OVERRIDE (IP): Mod: JZ | Performed by: INTERNAL MEDICINE

## 2024-09-21 PROCEDURE — A9270 NON-COVERED ITEM OR SERVICE: HCPCS

## 2024-09-21 PROCEDURE — 99233 SBSQ HOSP IP/OBS HIGH 50: CPT | Performed by: INTERNAL MEDICINE

## 2024-09-21 PROCEDURE — A9270 NON-COVERED ITEM OR SERVICE: HCPCS | Performed by: INTERNAL MEDICINE

## 2024-09-21 PROCEDURE — 99418 PROLNG IP/OBS E/M EA 15 MIN: CPT | Performed by: INTERNAL MEDICINE

## 2024-09-21 PROCEDURE — 80069 RENAL FUNCTION PANEL: CPT

## 2024-09-21 PROCEDURE — 770006 HCHG ROOM/CARE - MED/SURG/GYN SEMI*

## 2024-09-21 PROCEDURE — 700105 HCHG RX REV CODE 258: Performed by: INTERNAL MEDICINE

## 2024-09-21 PROCEDURE — 93971 EXTREMITY STUDY: CPT | Mod: RT

## 2024-09-21 PROCEDURE — 93971 EXTREMITY STUDY: CPT | Mod: 26,RT | Performed by: INTERNAL MEDICINE

## 2024-09-21 RX ADMIN — Medication: at 06:00

## 2024-09-21 RX ADMIN — PIPERACILLIN AND TAZOBACTAM 3.38 G: 3; .375 INJECTION, POWDER, FOR SOLUTION INTRAVENOUS at 20:58

## 2024-09-21 RX ADMIN — FERROUS SULFATE TAB 325 MG (65 MG ELEMENTAL FE) 325 MG: 325 (65 FE) TAB at 16:49

## 2024-09-21 RX ADMIN — PIPERACILLIN AND TAZOBACTAM 3.38 G: 3; .375 INJECTION, POWDER, FOR SOLUTION INTRAVENOUS at 09:39

## 2024-09-21 RX ADMIN — OXYCODONE HYDROCHLORIDE 5 MG: 5 TABLET ORAL at 07:53

## 2024-09-21 RX ADMIN — Medication 1 EACH: at 17:18

## 2024-09-21 RX ADMIN — ACETAMINOPHEN 650 MG: 325 TABLET ORAL at 00:14

## 2024-09-21 RX ADMIN — PIPERACILLIN AND TAZOBACTAM 3.38 G: 3; .375 INJECTION, POWDER, FOR SOLUTION INTRAVENOUS at 13:19

## 2024-09-21 RX ADMIN — ACETAMINOPHEN 650 MG: 325 TABLET ORAL at 05:49

## 2024-09-21 RX ADMIN — SENNOSIDES AND DOCUSATE SODIUM 2 TABLET: 50; 8.6 TABLET ORAL at 16:49

## 2024-09-21 RX ADMIN — OXYCODONE HYDROCHLORIDE 5 MG: 5 TABLET ORAL at 11:33

## 2024-09-21 RX ADMIN — ACETAMINOPHEN 650 MG: 325 TABLET ORAL at 11:29

## 2024-09-21 RX ADMIN — OXYCODONE HYDROCHLORIDE 5 MG: 5 TABLET ORAL at 19:50

## 2024-09-21 RX ADMIN — ACETAMINOPHEN 650 MG: 325 TABLET ORAL at 16:50

## 2024-09-21 RX ADMIN — FERROUS SULFATE TAB 325 MG (65 MG ELEMENTAL FE) 325 MG: 325 (65 FE) TAB at 07:53

## 2024-09-21 RX ADMIN — AMOXICILLIN AND CLAVULANATE POTASSIUM 1 TABLET: 875; 125 TABLET, FILM COATED ORAL at 05:49

## 2024-09-21 ASSESSMENT — ENCOUNTER SYMPTOMS
CONSTIPATION: 0
PALPITATIONS: 0
BLOOD IN STOOL: 0
HEADACHES: 0
INSOMNIA: 0
CHILLS: 0
NAUSEA: 0
FALLS: 0
WEAKNESS: 0
MYALGIAS: 0
TREMORS: 0
FOCAL WEAKNESS: 0
EYE REDNESS: 0
DIZZINESS: 0
SEIZURES: 0
EYE PAIN: 0
COUGH: 0
WHEEZING: 0
NERVOUS/ANXIOUS: 0
HEMOPTYSIS: 0
ABDOMINAL PAIN: 0
SHORTNESS OF BREATH: 0
DIARRHEA: 0
VOMITING: 0
LOSS OF CONSCIOUSNESS: 0
FEVER: 0

## 2024-09-21 ASSESSMENT — PAIN DESCRIPTION - PAIN TYPE
TYPE: ACUTE PAIN

## 2024-09-21 NOTE — CARE PLAN
The patient is Watcher - Medium risk of patient condition declining or worsening    Shift Goals  Clinical Goals: Biopsy results  Patient Goals: rest  Family Goals: gaetano    Progress made toward(s) clinical / shift goals:  Pt aox4. Family present at bedside. Routine medications given with prn oxycodone for c/o pain. Remains on iv abx with no adverse reactions. Vitals within normal limits. Needs attended to. Will continue to monitor.       Problem: Pain - Standard  Goal: Alleviation of pain or a reduction in pain to the patient’s comfort goal  Outcome: Progressing  Flowsheets  Taken 9/21/2024 1353 by Pastor Sierra R.N.  OB Pain Intervention: Medication - See MAR  Taken 9/21/2024 1133 by Pastor Sierra R.N.  Pain Rating Scale (NPRS): 8  Taken 9/21/2024 0214 by Roseann Bello R.N.  Non Verbal Scale:   Calm   Unlabored Breathing  Note: Prn oxycodone administered for facial pain      Problem: Knowledge Deficit - Standard  Goal: Patient and family/care givers will demonstrate understanding of plan of care, disease process/condition, diagnostic tests and medications  Outcome: Progressing  Note: Pt aox4. Family present at bedside. Knowledgeable about current condition. Aware of plan of care. Pending sensitivity for transition to PO abx        Patient is not progressing towards the following goals:

## 2024-09-21 NOTE — DIETARY
Nutrition Services: Diet Education Consult   Day 3 of admit.  Jean Holland is a 74 y.o. male with admitting DX of Facial mass. Pt with Hypercalcemia, suspected hypercalcemia of malignancy. Consult received for low calcium diet.    RD able to visit pt and wife at bedside to provide education. RD discussed sources of calcium. Pt to avoid a lot of dairy foods. He generally does not drink milk, but does eat cheese which he plans to cut back on. He does not eat other foods fortified in calcium. Pt and wife report a generally healthy diet at home. Encouraged to continue healthy, well balanced diet. RD able to answer all questions to patient's satisfaction.     No other education needs identified at this time. Consider referral to outpatient nutrition services for continuation of education as indicated or per pt preferences.     Please re-consult RD as indicated.

## 2024-09-21 NOTE — PROGRESS NOTES
[facility] Medicine Daily Progress Note    Date of Service  9/21/2024    Chief Complaint  Jean Holland is a 74 y.o. male admitted 9/18/2024 with Sent by MD (Pt ambulated to triage sent by [facility] to get evaluation for skin lesion in right side of face. Pt states that he had it for a year. )    Hospital Course  No notes on file    Interval Problem Update  Patient seen and examine at bedside  73yo M no known medical problems not on prescription medications, no surgical history, presented 9/18/2024 to our ED from [facility] for wourk-up and management of extensive fungating necrotic facial lesion extending to the R face and ear. FIrst reported small lesion inferior to his earlobe 7 years ago bit for the last year progressed. Was seen at [facility] and advised to come to the ED. At the ED, he is afebrile hemodynamically stable. WBC 28.2, Cr- 0.72, Ca 10.2    Managing facial mass possible SCC needs resection and biopsy, hypercalcemia.  WBC 30-24-25, bld Cx 9/19 NGTD wound culture multifloral. Antibiotics started. IV fluids for mild hypercalcemia (10-11), ordered Vit D level and PTH PENDING.  9/19 Blood cultures are NGTD however wound cultures positive for S. Aureus, pseudomonads, Clostridium which needs a special send out for sensitivities and Group B Strep. Discussed with Microbiology. Wound Team involved, recommended dressing changes. Hb 8.5-7.7. Reviewed iron panel Ordered PO iron.   Aside from the fungating lesion he has other lesions in the other side of the face and forehead, seems like actinic keratoses versus SCC. He denies dizziness, shortness of breath, chest pain or palpitations.   S/p skin biopsy by Dr. Willams, ENT I ordered pathology STILL PENDING. His opinion is might be too high risk for resection may just need palliative rads. I spoke with Dr. Contreras, Plastics. He agrees that resection may be difficult. I spoke with Dr. Robles. He agreed that next step would be Oncology to  determine chemo and immunotherapy as well as radiation therapy options. Likely nonoperative. I spoke with Dr. Power who recommends outpatient followup to his Oncology clinic.    He also discussed narcotics which I explained, risks, benefits and narcotics contract/refills. Family at bedside. I explained the oncology plan and the infectious plan. I explained need to clear infectionbefroe doing any therapy. Answered all their questions. We will trend WBC and have ID consult in the AM.  Offer HHC, Wound care and Wound clinic along with follow ups when cleared.    I reviewed the chart along with vitals, labs, imaging, test (both pending and resulted) and recommendations from specialists and interdisciplinary team.      I have discussed this patient's plan of care and discharge plan at IDT rounds today with Case Management, Nursing, Nursing leadership, and other members of the IDT team.    Consultants/Specialty  ENT  SUrgOnc    Code Status  DNAR/DNI    Disposition  The patient is not medically cleared for discharge to home or a post-acute facility.      I have placed the appropriate orders for post-discharge needs.    Review of Systems  Review of Systems   Constitutional:  Negative for chills and fever.   HENT:  Negative for congestion, hearing loss and nosebleeds.    Eyes:  Negative for pain and redness.   Respiratory:  Negative for cough, hemoptysis, shortness of breath and wheezing.    Cardiovascular:  Negative for chest pain and palpitations.   Gastrointestinal:  Negative for abdominal pain, blood in stool, constipation, diarrhea, nausea and vomiting.   Genitourinary:  Negative for dysuria, frequency and hematuria.   Musculoskeletal:  Negative for falls, joint pain and myalgias.   Skin:  Positive for rash.   Neurological:  Negative for dizziness, tremors, focal weakness, seizures, loss of consciousness, weakness and headaches.   Psychiatric/Behavioral:  The patient is not nervous/anxious and does not have insomnia.     All other systems reviewed and are negative.       Physical Exam  Temp:  [36.8 °C (98.2 °F)-37.7 °C (99.8 °F)] 36.9 °C (98.5 °F)  Pulse:  [82-90] 82  Resp:  [16-18] 18  BP: (104-117)/(43-54) 104/49  SpO2:  [93 %-96 %] 96 %    Physical Exam  Vitals and nursing note reviewed.   Constitutional:       Comments: Elderly, frail   HENT:      Head: Normocephalic and atraumatic.      Comments: Large fungating, ulcerative/necrotic mass R face extending to ear. Eyes are spared.      Right Ear: External ear normal.      Left Ear: External ear normal.      Nose: Nose normal.      Mouth/Throat:      Mouth: Mucous membranes are moist.   Eyes:      General: No scleral icterus.     Conjunctiva/sclera: Conjunctivae normal.   Cardiovascular:      Rate and Rhythm: Normal rate and regular rhythm.      Heart sounds: No murmur heard.     No friction rub. No gallop.   Pulmonary:      Effort: Pulmonary effort is normal.      Breath sounds: Normal breath sounds.   Abdominal:      General: Abdomen is flat. Bowel sounds are normal. There is no distension.      Palpations: Abdomen is soft.      Tenderness: There is no abdominal tenderness. There is no guarding.   Musculoskeletal:         General: Normal range of motion.      Cervical back: Normal range of motion and neck supple.   Skin:     General: Skin is warm.   Neurological:      Mental Status: He is alert and oriented to person, place, and time. Mental status is at baseline.   Psychiatric:         Mood and Affect: Mood normal.         Behavior: Behavior normal.         Thought Content: Thought content normal.         Judgment: Judgment normal.         Fluids    Intake/Output Summary (Last 24 hours) at 9/21/2024 1638  Last data filed at 9/21/2024 1029  Gross per 24 hour   Intake 620 ml   Output --   Net 620 ml        Laboratory  Recent Labs     09/19/24  0012 09/20/24  0355 09/21/24  0616   WBC 28.2* 24.6* 25.0*   RBC 3.04* 2.69* 2.69*   HEMOGLOBIN 8.5* 7.7* 7.6*   HEMATOCRIT 27.4* 24.8*  25.0*   MCV 90.1 92.2 92.9   MCH 28.0 28.6 28.3   MCHC 31.0* 31.0* 30.4*   RDW 45.1 46.3 46.7   PLATELETCT 528* 528* 523*   MPV 8.5* 8.3* 8.5*     Recent Labs     09/19/24  1959 09/20/24  0355 09/21/24  0616   SODIUM 140 139 137   POTASSIUM 3.9 4.4 4.2   CHLORIDE 108 108 105   CO2 23 23 18*   GLUCOSE 135* 115* 100*   BUN 12 16 14   CREATININE 1.01 1.03 0.80   CALCIUM 10.8* 11.1* 11.1*     Recent Labs     09/18/24  1740   APTT 26.5   INR 1.08               Imaging  US-EXTREMITY VENOUS LOWER BILAT         US-EXTREMITY VENOUS UPPER UNILAT RIGHT   Final Result      CT-SOFT TISSUE NECK WITH   Final Result      1.  Large RIGHT facial and neck mass involving both the external ear and the external auditory canal, likely neoplastic. This obscures the RIGHT parotid gland entirely.   2.  Small cutaneous or subcutaneous nodule overlying the LEFT side diaphragmatic arch   3.  No abnormally enlarged lymph nodes in the neck.      CT-HEAD W/O   Final Result         1. 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 There is much smaller 1.4 cm irregular    soft tissue density in the right anterior superior frontal subcutaneous soft tissues also consistent with malignancy.      2. Age-related cerebral atrophy.              Assessment/Plan  * Facial mass- (present on admission)  Assessment & Plan  CT with very large malignant R sided periauricular mass most likely SCC. Signs of infection with visible purulent discharge and WBCs >30.   Antibiotics  Spoke with ENT, skin biopsy planned  Spoke with Surg Onc   S/p biopsy pathology PENDING  ONC recommends f/u    Antibiotics follow wound sensitivities ID consult in the morning    Hypercalcemia  Assessment & Plan  Suspect hypercalcemia of malignancy   -PTH with morning labs to further eval for other causes   -IVF        Mild  IV  fluids  Trend    Normocytic anemia  Assessment & Plan  Suspect blood loss anemia in setting of oozing facial mass   -Iron panel and ferritin for further assessment   -Trend while inpt   Recent Labs     09/19/24  0012 09/20/24  0355 09/21/24  0616   HEMOGLOBIN 8.5* 7.7* 7.6*   HEMATOCRIT 27.4* 24.8* 25.0*   MCV 90.1 92.2 92.9   MCH 28.0 28.6 28.3   PLATELETCT 528* 528* 523*       No active bleed. Mixed iron deficiency; I will order iron supplementation         VTE prophylaxis: Chemoppx prob tomorrow    I have performed a physical exam and reviewed and updated ROS and Plan today (9/21/2024). In review of yesterday's note (9/20/2024), there are no changes except as documented above.    Patient is has a high medical complexity, complex decision making and is at high risk for complication, morbidity, and mortality, thus requiring the highest level of my preparedness for sudden, emergent intervention. Medical decision making is therefore complex. I provided  services, which included ordering labs and/or imaging, and discussing the case with various consultants.medication orders, frequent reevaluations of the patient's condition and response to treatment. Time was also devoted to counseling and coordinating care including review of records, pertinent lab data and studies, as well as discussing diagnostic evaluation and work up, planned therapeutic interventions and future disposition of care. Where indicated, the assessment and plan reflect discussion of patient with consultants, other healthcare providers, family members, and additional research needed to obtain further information in formulating the plan of care for Jean Holland. Total time spent was 65 minutes.

## 2024-09-21 NOTE — CARE PLAN
The patient is Stable - Low risk of patient condition declining or worsening    Shift Goals  Clinical Goals: Pain management, safety  Patient Goals: rest  Family Goals: gaetano    Progress made toward(s) clinical / shift goals:  Patient A&Ox4, resting in bed comfortably with HOB elevated. Reports pain scheduled med given with good relief.     Problem: Pain - Standard  Goal: Alleviation of pain or a reduction in pain to the patient’s comfort goal  Outcome: Progressing     Problem: Knowledge Deficit - Standard  Goal: Patient and family/care givers will demonstrate understanding of plan of care, disease process/condition, diagnostic tests and medications  Outcome: Progressing       Patient is not progressing towards the following goals:

## 2024-09-22 VITALS
RESPIRATION RATE: 18 BRPM | SYSTOLIC BLOOD PRESSURE: 106 MMHG | WEIGHT: 181.44 LBS | HEART RATE: 87 BPM | HEIGHT: 68 IN | OXYGEN SATURATION: 91 % | TEMPERATURE: 98.8 F | BODY MASS INDEX: 27.5 KG/M2 | DIASTOLIC BLOOD PRESSURE: 54 MMHG

## 2024-09-22 LAB
ALBUMIN SERPL BCP-MCNC: 2.9 G/DL (ref 3.2–4.9)
BUN SERPL-MCNC: 12 MG/DL (ref 8–22)
CALCIUM ALBUM COR SERPL-MCNC: 10.8 MG/DL (ref 8.5–10.5)
CALCIUM SERPL-MCNC: 9.9 MG/DL (ref 8.5–10.5)
CHLORIDE SERPL-SCNC: 105 MMOL/L (ref 96–112)
CO2 SERPL-SCNC: 22 MMOL/L (ref 20–33)
CREAT SERPL-MCNC: 0.96 MG/DL (ref 0.5–1.4)
EKG IMPRESSION: NORMAL
ERYTHROCYTE [DISTWIDTH] IN BLOOD BY AUTOMATED COUNT: 44.9 FL (ref 35.9–50)
GFR SERPLBLD CREATININE-BSD FMLA CKD-EPI: 82 ML/MIN/1.73 M 2
GLUCOSE SERPL-MCNC: 139 MG/DL (ref 65–99)
HCT VFR BLD AUTO: 22.8 % (ref 42–52)
HGB BLD-MCNC: 7.3 G/DL (ref 14–18)
MCH RBC QN AUTO: 28.6 PG (ref 27–33)
MCHC RBC AUTO-ENTMCNC: 32 G/DL (ref 32.3–36.5)
MCV RBC AUTO: 89.4 FL (ref 81.4–97.8)
PHOSPHATE SERPL-MCNC: 2.2 MG/DL (ref 2.5–4.5)
PLATELET # BLD AUTO: 511 K/UL (ref 164–446)
PMV BLD AUTO: 8.3 FL (ref 9–12.9)
POTASSIUM SERPL-SCNC: 3.8 MMOL/L (ref 3.6–5.5)
RBC # BLD AUTO: 2.55 M/UL (ref 4.7–6.1)
SODIUM SERPL-SCNC: 135 MMOL/L (ref 135–145)
WBC # BLD AUTO: 20.8 K/UL (ref 4.8–10.8)

## 2024-09-22 PROCEDURE — 85027 COMPLETE CBC AUTOMATED: CPT

## 2024-09-22 PROCEDURE — 80069 RENAL FUNCTION PANEL: CPT

## 2024-09-22 PROCEDURE — 36415 COLL VENOUS BLD VENIPUNCTURE: CPT

## 2024-09-22 PROCEDURE — 700101 HCHG RX REV CODE 250: Performed by: INTERNAL MEDICINE

## 2024-09-22 PROCEDURE — 700102 HCHG RX REV CODE 250 W/ 637 OVERRIDE(OP): Performed by: INTERNAL MEDICINE

## 2024-09-22 PROCEDURE — 93005 ELECTROCARDIOGRAM TRACING: CPT | Performed by: INTERNAL MEDICINE

## 2024-09-22 PROCEDURE — 99239 HOSP IP/OBS DSCHRG MGMT >30: CPT | Performed by: INTERNAL MEDICINE

## 2024-09-22 PROCEDURE — 93010 ELECTROCARDIOGRAM REPORT: CPT | Performed by: INTERNAL MEDICINE

## 2024-09-22 PROCEDURE — 700111 HCHG RX REV CODE 636 W/ 250 OVERRIDE (IP): Mod: JZ | Performed by: INTERNAL MEDICINE

## 2024-09-22 PROCEDURE — A9270 NON-COVERED ITEM OR SERVICE: HCPCS | Performed by: INTERNAL MEDICINE

## 2024-09-22 PROCEDURE — 700105 HCHG RX REV CODE 258: Performed by: INTERNAL MEDICINE

## 2024-09-22 RX ORDER — ACETAMINOPHEN 325 MG/1
325 TABLET ORAL EVERY 6 HOURS PRN
COMMUNITY
Start: 2024-09-22 | End: 2024-09-30 | Stop reason: SDUPTHER

## 2024-09-22 RX ORDER — AMOXICILLIN 250 MG
2 CAPSULE ORAL EVERY EVENING
COMMUNITY
Start: 2024-09-22

## 2024-09-22 RX ORDER — HYDROCODONE BITARTRATE AND ACETAMINOPHEN 5; 325 MG/1; MG/1
1 TABLET ORAL EVERY 6 HOURS PRN
Qty: 12 TABLET | Refills: 0 | Status: SHIPPED | OUTPATIENT
Start: 2024-09-22 | End: 2024-09-25 | Stop reason: SDUPTHER

## 2024-09-22 RX ORDER — FERROUS SULFATE 325(65) MG
325 TABLET ORAL 2 TIMES DAILY WITH MEALS
COMMUNITY
Start: 2024-09-22

## 2024-09-22 RX ORDER — CIPROFLOXACIN 500 MG/1
500 TABLET, FILM COATED ORAL 2 TIMES DAILY
Qty: 20 TABLET | Refills: 0 | Status: ACTIVE | OUTPATIENT
Start: 2024-09-22 | End: 2024-10-02

## 2024-09-22 RX ORDER — BACITRACIN ZINC AND POLYMYXIN B SULFATE 500; 1000 [USP'U]/G; [USP'U]/G
1 OINTMENT TOPICAL 2 TIMES DAILY
Qty: 1 EACH | Refills: 0 | Status: ACTIVE | OUTPATIENT
Start: 2024-09-22 | End: 2024-09-25 | Stop reason: SDUPTHER

## 2024-09-22 RX ORDER — POLYETHYLENE GLYCOL 3350 17 G/17G
17 POWDER, FOR SOLUTION ORAL
COMMUNITY
Start: 2024-09-22

## 2024-09-22 RX ORDER — LINEZOLID 600 MG/1
600 TABLET, FILM COATED ORAL 2 TIMES DAILY
Qty: 20 TABLET | Refills: 0 | Status: ACTIVE | OUTPATIENT
Start: 2024-09-22 | End: 2024-10-02

## 2024-09-22 RX ADMIN — PIPERACILLIN AND TAZOBACTAM 3.38 G: 3; .375 INJECTION, POWDER, FOR SOLUTION INTRAVENOUS at 05:36

## 2024-09-22 RX ADMIN — Medication 1 EACH: at 05:32

## 2024-09-22 RX ADMIN — ACETAMINOPHEN 650 MG: 325 TABLET ORAL at 05:30

## 2024-09-22 RX ADMIN — FERROUS SULFATE TAB 325 MG (65 MG ELEMENTAL FE) 325 MG: 325 (65 FE) TAB at 10:13

## 2024-09-22 RX ADMIN — OXYCODONE HYDROCHLORIDE 5 MG: 5 TABLET ORAL at 10:17

## 2024-09-22 RX ADMIN — OXYCODONE HYDROCHLORIDE 5 MG: 5 TABLET ORAL at 05:30

## 2024-09-22 ASSESSMENT — PAIN DESCRIPTION - PAIN TYPE
TYPE: ACUTE PAIN
TYPE: ACUTE PAIN

## 2024-09-22 NOTE — DISCHARGE SUMMARY
Discharge Summary    CHIEF COMPLAINT ON ADMISSION  Chief Complaint   Patient presents with    Sent by MD     Pt ambulated to triage sent by [facility] to get evaluation for skin lesion in right side of face. Pt states that he had it for a year.        Reason for Admission  Sent by MD     Admission Date  9/18/2024    CODE STATUS  Prior    HPI & HOSPITAL COURSE  This is a 74 y.o. male here with Sent by MD (Pt ambulated to triage sent by [facility]ist to get evaluation for skin lesion in right side of face. Pt states that he had it for a year. )  Patient seen and examine at bedside  73yo M no known medical problems not on prescription medications, no surgical history, presented 9/18/2024 to our ED from [facility] for wourk-up and management of extensive fungating necrotic facial lesion extending to the R face and ear. FIrst reported small lesion inferior to his earlobe 7 years ago bit for the last year progressed. Was seen at [facility] and advised to come to the ED. At the ED, he is afebrile hemodynamically stable. WBC 28.2, Cr- 0.72, Ca 10.2     Managing facial mass possible SCC needs resection and biopsy, hypercalcemia.  WBC 30-24-25, bld Cx 9/19 NGTD wound culture multifloral. Antibiotics started. IV fluids for mild hypercalcemia (10-11), this normalized at discharge.  9/19 Blood cultures are NGTD however wound cultures positive for S. Aureus, pseudomonads, Clostridium which needs a special send out for sensitivities and Group B Strep. Discussed with Microbiology. Wound Team involved, recommended dressing changes. Hb 8.5-7.7. Reviewed iron panel Ordered PO iron.   Aside from the fungating lesion he has other lesions in the other side of the face and forehead, seems like actinic keratoses versus SCC. He denies dizziness, shortness of breath, chest pain or palpitations.   S/p skin biopsy by Dr. Willams, ENT I ordered pathology STILL PENDING this can be FOLLOWED UP OUTPATIENT. His opinion is  might be too high risk for resection may just need palliative rads. I spoke with Dr. Contreras, Plastics. He agrees that resection may be difficult. I spoke with Dr. Robles. He agreed that next step would be Oncology to determine chemo and immunotherapy as well as radiation therapy options. Likely nonoperative. I spoke with Dr. Power who recommends outpatient followup to his Oncology clinic.     He also discussed narcotics which I explained, risks, benefits and narcotics contract/refills. WBC came down to 20.I spoke with ID Pharmacy and with what is growing we agreed on Cipro and linezolid for 10 days. His EKG showed normal QTc. [facility].     Therefore, he is discharged in fair and stable condition to home with close outpatient follow-up.    The patient met 2-midnight criteria for an inpatient stay at the time of discharge.    Discharge Date  9/22/2024    FOLLOW UP ITEMS POST DISCHARGE  PATHOLOGY    DISCHARGE DIAGNOSES  Principal Problem:    Facial mass (POA: Yes)  Active Problems:    Normocytic anemia (POA: Unknown)    Hypercalcemia (POA: Unknown)        FOLLOW UP  Future Appointments   Date Time Provider Department Center   9/25/2024 11:00 AM [facility]     No follow-up provider specified.  Assign and follow up with primary provider or discharge clinic physician in 1 week [facility] along with his instructions. Follow up ID clinic in 1 week for cellulitis face, follow up on clostrisdium sensitivities NURSING provide resources/pamphlets for Instructions from Dr. Deshpande, antibiotic use (check EKGs now an then for QTc cipro use, CBC for linezolid use), narc use (Avoid swimming, driving or operating machinery. Treat constipation with prescribed bowel regimen)  MEDICATIONS ON DISCHARGE     Medication List        START taking these medications        Instructions   acetaminophen 325 MG Tabs  Commonly known as:  Tylenol   Take 1 Tablet by mouth every 6 hours as needed for Mild Pain, Fever or Moderate Pain.  Dose: 325 mg     bacitracin-polymyxin b 500-57191 UNIT/GM Oint  Commonly known as: Polysporin   Apply 1 Each topically 2 times a day.  Dose: 1 Each     ciprofloxacin 500 MG Tabs  Commonly known as: Cipro   Take 1 Tablet by mouth 2 times a day for 10 days.  Dose: 500 mg     ferrous sulfate 325 (65 Fe) MG tablet   Take 1 Tablet by mouth 2 times a day with meals.  Dose: 325 mg     HYDROcodone-acetaminophen 5-325 MG Tabs per tablet  Commonly known as: Norco   Take 1 Tablet by mouth every 6 hours as needed (severe pain) for up to 3 days.  Dose: 1 Tablet     linezolid 600 MG Tabs  Commonly known as: Zyvox   Take 1 Tablet by mouth 2 times a day for 10 days.  Dose: 600 mg     polyethylene glycol/lytes Pack  Commonly known as: Miralax   Take 1 Packet by mouth 1 time a day as needed (if no bowel movement in last 2 days).  Dose: 17 g     senna-docusate 8.6-50 MG Tabs  Commonly known as: Pericolace Or Senokot S   Take 2 Tablets by mouth every evening.  Dose: 2 Tablet            CONTINUE taking these medications        Instructions   ibuprofen 200 MG Tabs  Commonly known as: Motrin   Take 200 mg by mouth every 6 hours as needed for Mild Pain.  Dose: 200 mg              Allergies  No Known Allergies    DIET  No orders of the defined types were placed in this encounter.      ACTIVITY  Avoid heavy lifting or strenuous activity      CONSULTATIONS  SUrgOn    PROCEDURES      LABORATORY  Lab Results   Component Value Date    SODIUM 135 09/22/2024    POTASSIUM 3.8 09/22/2024    CHLORIDE 105 09/22/2024    CO2 22 09/22/2024    GLUCOSE 139 (H) 09/22/2024    BUN 12 09/22/2024    CREATININE 0.96 09/22/2024        Lab Results   Component Value Date    WBC 20.8 (H) 09/22/2024    HEMOGLOBIN 7.3 (L) 09/22/2024    HEMATOCRIT 22.8 (L) 09/22/2024    PLATELETCT 511 (H) 09/22/2024        Total time of the discharge process exceeds 60 minutes.

## 2024-09-22 NOTE — CARE PLAN
The patient is Stable - Low risk of patient condition declining or worsening    Shift Goals  Clinical Goals: Safety, IV abx  Patient Goals: Rest  Family Goals: gaetano    Progress made toward(s) clinical / shift goals: Reports pain on Rt side of the face, prn medication given with good relief. Independent with ADL's.     Problem: Pain - Standard  Goal: Alleviation of pain or a reduction in pain to the patient’s comfort goal  Outcome: Progressing     Problem: Knowledge Deficit - Standard  Goal: Patient and family/care givers will demonstrate understanding of plan of care, disease process/condition, diagnostic tests and medications  Outcome: Progressing       Patient is not progressing towards the following goals:

## 2024-09-23 ENCOUNTER — TELEPHONE (OUTPATIENT)
Dept: HEALTH INFORMATION MANAGEMENT | Facility: OTHER | Age: 74
End: 2024-09-23

## 2024-09-24 ENCOUNTER — TELEPHONE (OUTPATIENT)
Dept: HEALTH INFORMATION MANAGEMENT | Facility: OTHER | Age: 74
End: 2024-09-24

## 2024-09-24 LAB
BACTERIA BLD CULT: NORMAL
BACTERIA BLD CULT: NORMAL
SIGNIFICANT IND 70042: NORMAL
SIGNIFICANT IND 70042: NORMAL
SITE SITE: NORMAL
SITE SITE: NORMAL
SOURCE SOURCE: NORMAL
SOURCE SOURCE: NORMAL

## 2024-09-25 ENCOUNTER — OFFICE VISIT (OUTPATIENT)
Dept: MEDICAL GROUP | Facility: PHYSICIAN GROUP | Age: 74
End: 2024-09-25

## 2024-09-25 VITALS
TEMPERATURE: 97.7 F | RESPIRATION RATE: 12 BRPM | BODY MASS INDEX: 27.89 KG/M2 | HEART RATE: 81 BPM | WEIGHT: 184 LBS | OXYGEN SATURATION: 96 % | SYSTOLIC BLOOD PRESSURE: 112 MMHG | DIASTOLIC BLOOD PRESSURE: 50 MMHG | HEIGHT: 68 IN

## 2024-09-25 DIAGNOSIS — D50.0 IRON DEFICIENCY ANEMIA DUE TO CHRONIC BLOOD LOSS: ICD-10-CM

## 2024-09-25 DIAGNOSIS — R22.0 FACIAL MASS: ICD-10-CM

## 2024-09-25 DIAGNOSIS — C44.320 SQUAMOUS CELL CARCINOMA OF FACE: ICD-10-CM

## 2024-09-25 PROCEDURE — 3074F SYST BP LT 130 MM HG: CPT | Performed by: PHYSICIAN ASSISTANT

## 2024-09-25 PROCEDURE — 3078F DIAST BP <80 MM HG: CPT | Performed by: PHYSICIAN ASSISTANT

## 2024-09-25 PROCEDURE — 99204 OFFICE O/P NEW MOD 45 MIN: CPT | Performed by: PHYSICIAN ASSISTANT

## 2024-09-25 RX ORDER — BACITRACIN ZINC AND POLYMYXIN B SULFATE 500; 1000 [USP'U]/G; [USP'U]/G
1 OINTMENT TOPICAL 2 TIMES DAILY
Qty: 30 G | Refills: 0 | Status: SHIPPED | OUTPATIENT
Start: 2024-09-25 | End: 2024-09-27 | Stop reason: SDUPTHER

## 2024-09-25 RX ORDER — HYDROCODONE BITARTRATE AND ACETAMINOPHEN 5; 325 MG/1; MG/1
1-2 TABLET ORAL EVERY 8 HOURS PRN
Qty: 84 TABLET | Refills: 0 | Status: SHIPPED | OUTPATIENT
Start: 2024-09-25 | End: 2024-10-09

## 2024-09-25 ASSESSMENT — FIBROSIS 4 INDEX: FIB4 SCORE: 0.64

## 2024-09-25 NOTE — PROGRESS NOTES
CC:    Chief Complaint   Patient presents with    Establish Care       HISTORY OF THE PRESENT ILLNESS: Patient is a 74 y.o. male presenting to establish primary care     Pt has large lesion over R side of his face and ear. He states it has been present for two years. Tried to heal it on his own. About six months ago the size massively increased. Not previously seeing derm. Lesion started leaking extensively. He went to [facility] office, had photos taken and then told to go to the ER. Was in hospital for five days. Had biopsy taken by ENT and returned a squamous cell carcinoma. Plan is to have radiation and immunotherapy to shink tumor in order to do surgery.   Pt's WBC count elevated at 31 when admitted to hospital. His hgb/hct at 8.5/27.4 upon admission and reduced to 7.3/22.8 upon discharge. Notes that the mass over his face had been bleeding quite profusely.     No problem-specific Assessment & Plan notes found for this encounter.    Allergies: Patient has no known allergies.    Current Outpatient Medications Ordered in Epic   Medication Sig Dispense Refill    acetaminophen (TYLENOL) 325 MG Tab Take 1 Tablet by mouth every 6 hours as needed for Mild Pain, Fever or Moderate Pain.      bacitracin-polymyxin b (POLYSPORIN) 500-49826 UNIT/GM Ointment Apply 1 Each topically 2 times a day. 1 Each 0    ferrous sulfate 325 (65 Fe) MG tablet Take 1 Tablet by mouth 2 times a day with meals.      HYDROcodone-acetaminophen (NORCO) 5-325 MG Tab per tablet Take 1 Tablet by mouth every 6 hours as needed (severe pain) for up to 3 days. 12 Tablet 0    senna-docusate (PERICOLACE OR SENOKOT S) 8.6-50 MG Tab Take 2 Tablets by mouth every evening.      polyethylene glycol/lytes (MIRALAX) Pack Take 1 Packet by mouth 1 time a day as needed (if no bowel movement in last 2 days).      ciprofloxacin (CIPRO) 500 MG Tab Take 1 Tablet by mouth 2 times a day for 10 days. 20 Tablet 0    linezolid (ZYVOX) 600 MG Tab Take 1 Tablet by mouth 2  times a day for 10 days. 20 Tablet 0    ibuprofen (MOTRIN) 200 MG Tab Take 200 mg by mouth every 6 hours as needed for Mild Pain.       No current Epic-ordered facility-administered medications on file.       No past medical history on file.    No past surgical history on file.    Social History     Tobacco Use    Smoking status: Former     Current packs/day: 0.00     Average packs/day: 0.5 packs/day for 6.0 years (3.0 ttl pk-yrs)     Types: Cigarettes     Start date: 1968     Quit date: 1974     Years since quittin.7    Smokeless tobacco: Never   Vaping Use    Vaping status: Never Used   Substance Use Topics    Alcohol use: Not Currently    Drug use: Never       Social History     Social History Narrative    Not on file       No family history on file.    ROS:     - Constitutional: Negative for fever, chills, unexpected weight change, and fatigue/generalized weakness.     - HEENT: Negative for headaches, vision changes, hearing changes, ear pain, ear discharge, rhinorrhea, sinus congestion, sore throat, and neck pain.      - Respiratory: Negative for cough, sputum production, chest congestion, dyspnea, wheezing, and crackles.      - Cardiovascular: Negative for chest pain, palpitations, orthopnea, and bilateral lower extremity edema.     - Gastrointestinal: Negative for heartburn, nausea, vomiting, abdominal pain, hematochezia, melena, diarrhea, constipation, and greasy/foul-smelling stools.     - Genitourinary: Negative for dysuria, polyuria, hematuria, pyuria, urinary urgency, and urinary incontinence.     - Musculoskeletal: Negative for myalgias, back pain, and joint pain.     - Skin: Positive for skin lesion. Negative for cyanotic skin color change.     - Neurological: Negative for dizziness, tingling, tremors, focal sensory deficit, focal weakness and headaches.     - Endo/Heme/Allergies: Does not bruise/bleed easily.     - Psychiatric/Behavioral: Negative for depression, suicidal/homicidal  "ideation and memory loss.            .      Exam: /50   Pulse 81   Temp 36.5 °C (97.7 °F) (Temporal)   Resp 12   Ht 1.727 m (5' 8\")   Wt 83.5 kg (184 lb)   SpO2 96%  Body mass index is 27.98 kg/m².    General: Normal appearing. No acute distress.  Skin: Warm and dry.    HEENT: Normocephalic. Eyes conjunctiva clear lids without ptosis, ears normal shape and contour. Positive for very large irregularly shaped mass that covers his entire R ear and R periauricular area.   Cardiovascular: Regular rate and rhythm without murmur.   Respiratory: Clear to auscultation bilaterally, no rhonchi wheezing or rales.  Neurologic: Grossly nonfocal, A&O x3, gait normal,  Musculoskeletal: No deformity or swelling.   Extremities: No extremity cyanosis, clubbing, or edema.  Psych: Normal mood and affect. Judgment and insight is normal.    Please note that this dictation was created using voice recognition software. I have made every reasonable attempt to correct obvious errors, but I expect that there are errors of grammar and possibly content that I did not discover before finalizing the note.      Assessment/Plan    1. Facial mass  Pt has quite extensive mass over his face. I gave him contact info for [facility] and Dr Power. I will take over his pain management for this.   - bacitracin-polymyxin b (POLYSPORIN) 500-73258 UNIT/GM Ointment; Apply 1 Each topically 2 times a day.  Dispense: 30 g; Refill: 0  - HYDROcodone-acetaminophen (NORCO) 5-325 MG Tab per tablet; Take 1-2 Tablets by mouth every 8 hours as needed (severe pain) for up to 14 days.  Dispense: 84 Tablet; Refill: 0    2. Iron deficiency anemia due to chronic blood loss  This was trending down upon hospital discharge so will check to make sure this is stabilizing.   - CBC WITH DIFFERENTIAL; Future  - IRON/TOTAL IRON BIND; Future  - FERRITIN; Future    3. Squamous cell carcinoma of face  F/u with [facility]  - Controlled Substance Treatment Agreement    "

## 2024-09-27 DIAGNOSIS — R22.0 FACIAL MASS: ICD-10-CM

## 2024-09-30 ENCOUNTER — TELEPHONE (OUTPATIENT)
Dept: MEDICAL GROUP | Facility: PHYSICIAN GROUP | Age: 74
End: 2024-09-30

## 2024-09-30 DIAGNOSIS — R22.0 FACIAL MASS: ICD-10-CM

## 2024-09-30 RX ORDER — BACITRACIN ZINC AND POLYMYXIN B SULFATE 500; 1000 [USP'U]/G; [USP'U]/G
1 OINTMENT TOPICAL 2 TIMES DAILY
Qty: 30 G | Refills: 0 | Status: SHIPPED | OUTPATIENT
Start: 2024-09-30

## 2024-09-30 RX ORDER — ACETAMINOPHEN 325 MG/1
325 TABLET ORAL EVERY 6 HOURS PRN
Qty: 30 TABLET | Refills: 1 | Status: SHIPPED | OUTPATIENT
Start: 2024-09-30

## 2024-09-30 RX ORDER — HYDROCODONE BITARTRATE AND ACETAMINOPHEN 5; 325 MG/1; MG/1
1-2 TABLET ORAL EVERY 8 HOURS PRN
Qty: 84 TABLET | Refills: 0 | OUTPATIENT
Start: 2024-09-30 | End: 2024-10-14

## 2024-09-30 RX ORDER — BACITRACIN ZINC AND POLYMYXIN B SULFATE 500; 1000 [USP'U]/G; [USP'U]/G
1 OINTMENT TOPICAL 2 TIMES DAILY
Qty: 30 G | Refills: 0 | Status: SHIPPED | OUTPATIENT
Start: 2024-09-30 | End: 2024-10-01 | Stop reason: SDUPTHER

## 2024-09-30 NOTE — TELEPHONE ENCOUNTER
Pt needs medication sent [facility] in [place] for bacitracin and acetaminophen. [facility] advised they are out of this medication.

## 2024-10-01 ENCOUNTER — APPOINTMENT (OUTPATIENT)
Dept: MEDICAL GROUP | Facility: PHYSICIAN GROUP | Age: 74
End: 2024-10-01

## 2024-10-01 DIAGNOSIS — R22.0 FACIAL MASS: ICD-10-CM

## 2024-10-01 RX ORDER — BACITRACIN ZINC AND POLYMYXIN B SULFATE 500; 1000 [USP'U]/G; [USP'U]/G
1 OINTMENT TOPICAL 2 TIMES DAILY
Qty: 30 G | Refills: 0 | Status: SHIPPED | OUTPATIENT
Start: 2024-10-01 | End: 2024-10-09

## 2024-10-01 RX ORDER — HYDROCODONE BITARTRATE AND ACETAMINOPHEN 5; 325 MG/1; MG/1
1-2 TABLET ORAL EVERY 8 HOURS PRN
Qty: 84 TABLET | Refills: 0 | Status: SHIPPED | OUTPATIENT
Start: 2024-10-01 | End: 2024-10-15

## 2024-10-01 RX ORDER — ACETAMINOPHEN 325 MG/1
325 TABLET ORAL EVERY 6 HOURS PRN
Qty: 30 TABLET | Refills: 1 | Status: SHIPPED | OUTPATIENT
Start: 2024-10-01 | End: 2024-10-09

## 2024-10-09 ENCOUNTER — OFFICE VISIT (OUTPATIENT)
Dept: MEDICAL GROUP | Facility: PHYSICIAN GROUP | Age: 74
End: 2024-10-09

## 2024-10-09 VITALS
BODY MASS INDEX: 28.8 KG/M2 | RESPIRATION RATE: 14 BRPM | DIASTOLIC BLOOD PRESSURE: 54 MMHG | HEIGHT: 68 IN | HEART RATE: 89 BPM | SYSTOLIC BLOOD PRESSURE: 116 MMHG | WEIGHT: 190.04 LBS | TEMPERATURE: 98 F | OXYGEN SATURATION: 93 %

## 2024-10-09 DIAGNOSIS — D04.30 SQUAMOUS CELL CARCINOMA IN SITU (SCCIS) OF SKIN OF FACE: ICD-10-CM

## 2024-10-09 PROCEDURE — 99213 OFFICE O/P EST LOW 20 MIN: CPT | Performed by: STUDENT IN AN ORGANIZED HEALTH CARE EDUCATION/TRAINING PROGRAM

## 2024-10-09 PROCEDURE — 3078F DIAST BP <80 MM HG: CPT | Performed by: STUDENT IN AN ORGANIZED HEALTH CARE EDUCATION/TRAINING PROGRAM

## 2024-10-09 PROCEDURE — 3074F SYST BP LT 130 MM HG: CPT | Performed by: STUDENT IN AN ORGANIZED HEALTH CARE EDUCATION/TRAINING PROGRAM

## 2024-10-09 ASSESSMENT — FIBROSIS 4 INDEX: FIB4 SCORE: 0.64

## 2024-10-15 LAB
BACTERIA WND AEROBE CULT: ABNORMAL
GRAM STN SPEC: ABNORMAL
SIGNIFICANT IND 70042: ABNORMAL
SITE SITE: ABNORMAL
SOURCE SOURCE: ABNORMAL